# Patient Record
Sex: MALE | Race: WHITE
[De-identification: names, ages, dates, MRNs, and addresses within clinical notes are randomized per-mention and may not be internally consistent; named-entity substitution may affect disease eponyms.]

---

## 2017-01-02 ENCOUNTER — HOSPITAL ENCOUNTER (OUTPATIENT)
Dept: HOSPITAL 45 - C.RAD1850 | Age: 77
Discharge: HOME | End: 2017-01-02
Attending: NURSE PRACTITIONER
Payer: COMMERCIAL

## 2017-01-02 DIAGNOSIS — M85.80: ICD-10-CM

## 2017-01-02 DIAGNOSIS — M25.521: Primary | ICD-10-CM

## 2017-01-02 NOTE — DIAGNOSTIC IMAGING REPORT
RIGHT ELBOW 3 VIEWS



CLINICAL HISTORY: Right elbow pain and swelling. No reported history of trauma.



FINDINGS: 3 views of the right elbow are obtained. No prior studies are

available for comparison at the time of dictation. The skeletal structures are

osteopenic. There is no radiographic evidence of fracture. There is degenerative

spurring from the radial head. Enthesophytes arise from the lateral humeral

epicondyle. There is no joint effusion. A large enthesophyte is seen at the

triceps insertion. Mild dorsal soft tissue edema is noted.



IMPRESSION:



1. Osteopenia and degenerative change as above. No acute bony abnormality is

identified.



2. There is dorsal soft tissue edema, possibly represent bursitis. Clinical

correlation will be required.







Electronically signed by:  Marvin Wild M.D.

1/2/2017 2:31 PM

## 2017-02-20 ENCOUNTER — HOSPITAL ENCOUNTER (OUTPATIENT)
Dept: HOSPITAL 45 - C.RDSM | Age: 77
Discharge: HOME | End: 2017-02-20
Attending: ORTHOPAEDIC SURGERY
Payer: COMMERCIAL

## 2017-02-20 DIAGNOSIS — M25.561: Primary | ICD-10-CM

## 2017-03-24 ENCOUNTER — HOSPITAL ENCOUNTER (OUTPATIENT)
Dept: HOSPITAL 45 - C.RDSM | Age: 77
Discharge: HOME | End: 2017-03-24
Attending: ORTHOPAEDIC SURGERY
Payer: COMMERCIAL

## 2017-03-24 DIAGNOSIS — M25.522: Primary | ICD-10-CM

## 2017-05-24 ENCOUNTER — HOSPITAL ENCOUNTER (OUTPATIENT)
Dept: HOSPITAL 45 - C.PET | Age: 77
Discharge: HOME | End: 2017-05-24
Payer: COMMERCIAL

## 2017-05-24 DIAGNOSIS — R91.1: ICD-10-CM

## 2017-05-24 DIAGNOSIS — C15.5: Primary | ICD-10-CM

## 2017-05-24 DIAGNOSIS — R93.2: ICD-10-CM

## 2017-05-24 NOTE — DIAGNOSTIC IMAGING REPORT
PET/CT



HISTORY:      Esophageal cancer.



TECHNIQUE: PET/CT was performed from the base of the skull through the pelvis

following the intravenous administration of 15 mCi of F18-FDG. Non-contrast CT

imaging was performed over the same range without breath-hold for attenuation

correction of PET images and anatomic correlation, but not for primary

interpretation as it is not of standard diagnostic quality.



CT DOSE:    

 

COMPARISON: PET CT 11/2/2016.



FINDINGS:

 

HEAD AND NECK:  There is no FDG-avid disease or significant lymphadenopathy in

the imaged portions of the head and the neck. Vocal cord paralysis is again

noted.

 

CHEST:  The patient is status post esophagectomy with gastric pull-up. Stable 6

mm upper right peritracheal lymph node. This does not demonstrate abnormal FDG

uptake. The superior mediastinal lymph node adjacent to the left proximal

portion of the gastric pull-up measures 1.5 x 1.0 cm. This is similar in size

compared to the prior study. However, this now demonstrates moderate FDG uptake

with an SUV max of 4.1. No additional sites of FDG avid disease within the

chest. Right posterior pleural thickening remains unchanged. No change in the 2

cm nodular density at the base of the right lower lobe which favors atelectasis

or scarring. This does not demonstrate abnormal FDG uptake. Small patchy density

at the base of the lingula has improved. There is a 1 cm groundglass irregular

nodule within the right upper lobe on image 91. This remains unchanged. This

does not demonstrate abnormal FDG uptake.

 

ABDOMEN/PELVIS:  A focal 1.5 cm area of moderate FDG uptake within the left

hepatic lobe on image 119 within SUV max of 4.1. No definite corresponding

lesion on CT. This could represent misregistration with the adjacent bowel.

Stable hypodense lesions within the liver which likely represent cysts. No FDG

avid retroperitoneal or pelvic lymphadenopathy. Colonic diverticulosis. The

prostate gland is enlarged. Right lateral abdominal wall lipoma is again noted.

 

MUSCULOSKELETAL:  There is no FDG-avid or destructive bone lesion.

 

IMPRESSION:  

1. Interval development of moderate FDG uptake associated with the left superior

mediastinal lymph node consistent with progression of disease.

2. A focal 1.5 cm area of moderate FDG uptake within the left hepatic lobe.

There is no definite corresponding lesion on CT. This may represent

misregistration with the adjacent bowel. However, this bears watching future

examinations to exclude a developing metastatic lesion.

3. Otherwise, no FDG avid disease identified.

4. Stable 1 cm irregular groundglass nodule within the right upper lobe. This

does not demonstrate abnormal FDG uptake. However, the stability favors a

low-grade neoplasm.







Electronically signed by:  Yonathan Reyes M.D.

5/24/2017 12:10 PM



Dictated Date/Time:  5/24/2017 11:45 AM

## 2017-08-16 ENCOUNTER — HOSPITAL ENCOUNTER (OUTPATIENT)
Dept: HOSPITAL 45 - C.RAD | Age: 77
Discharge: HOME | End: 2017-08-16
Attending: INTERNAL MEDICINE
Payer: COMMERCIAL

## 2017-08-16 DIAGNOSIS — J40: Primary | ICD-10-CM

## 2017-08-16 NOTE — DIAGNOSTIC IMAGING REPORT
TWO VIEW CHEST



CLINICAL HISTORY: Bronchitis.



FINDINGS: PA and lateral chest radiographs are compared to study dated 7/6/2016

and correlated with chest CT dated 1/12/2015. The cardiomediastinal silhouette

is unremarkable. There is atherosclerotic calcification of the thoracic aorta.

Chronic interstitial thickening is similar to previous. No airspace

consolidation or pleural effusion is identified. There is no pneumothorax. The

skeletal structures are osteopenic. Degenerative change is noted throughout the

thoracic spine.



IMPRESSION: No active disease in the chest.







Electronically signed by:  Marvin Wild M.D.

8/16/2017 12:59 PM



Dictated Date/Time:  8/16/2017 12:57 PM

## 2017-11-13 ENCOUNTER — HOSPITAL ENCOUNTER (OUTPATIENT)
Dept: HOSPITAL 45 - C.LABSPEC | Age: 77
Discharge: HOME | End: 2017-11-13
Attending: INTERNAL MEDICINE
Payer: COMMERCIAL

## 2017-11-13 DIAGNOSIS — R41.3: ICD-10-CM

## 2017-11-13 DIAGNOSIS — C15.9: Primary | ICD-10-CM

## 2017-11-13 LAB
ALBUMIN/GLOB SERPL: 0.9 {RATIO} (ref 0.9–2)
ALP SERPL-CCNC: 96 U/L (ref 45–117)
ALT SERPL-CCNC: 24 U/L (ref 12–78)
ANION GAP SERPL CALC-SCNC: 8 MMOL/L (ref 3–11)
AST SERPL-CCNC: 24 U/L (ref 15–37)
BASOPHILS # BLD: 0.01 K/UL (ref 0–0.2)
BASOPHILS NFR BLD: 0.2 %
BUN SERPL-MCNC: 20 MG/DL (ref 7–18)
BUN/CREAT SERPL: 14.8 (ref 10–20)
CALCIUM SERPL-MCNC: 8.8 MG/DL (ref 8.5–10.1)
CHLORIDE SERPL-SCNC: 107 MMOL/L (ref 98–107)
CO2 SERPL-SCNC: 27 MMOL/L (ref 21–32)
COMPLETE: YES
CREAT SERPL-MCNC: 1.36 MG/DL (ref 0.6–1.4)
EOSINOPHIL NFR BLD AUTO: 284 K/UL (ref 130–400)
GLOBULIN SER-MCNC: 3.7 GM/DL (ref 2.5–4)
GLUCOSE SERPL-MCNC: 93 MG/DL (ref 70–99)
HCT VFR BLD CALC: 42.4 % (ref 42–52)
IG%: 0.2 %
IMM GRANULOCYTES NFR BLD AUTO: 28.1 %
LYMPHOCYTES # BLD: 1.59 K/UL (ref 1.2–3.4)
MCH RBC QN AUTO: 30.9 PG (ref 25–34)
MCHC RBC AUTO-ENTMCNC: 33.7 G/DL (ref 32–36)
MCV RBC AUTO: 91.6 FL (ref 80–100)
MONOCYTES NFR BLD: 9.7 %
NEUTROPHILS # BLD AUTO: 2.1 %
NEUTROPHILS NFR BLD AUTO: 59.7 %
PMV BLD AUTO: 9.4 FL (ref 7.4–10.4)
POTASSIUM SERPL-SCNC: 3.5 MMOL/L (ref 3.5–5.1)
RBC # BLD AUTO: 4.63 M/UL (ref 4.7–6.1)
SODIUM SERPL-SCNC: 142 MMOL/L (ref 136–145)
TSH SERPL-ACNC: 0.98 UIU/ML (ref 0.3–4.5)
WBC # BLD AUTO: 5.66 K/UL (ref 4.8–10.8)

## 2017-12-01 ENCOUNTER — HOSPITAL ENCOUNTER (OUTPATIENT)
Dept: HOSPITAL 45 - C.CTS | Age: 77
Discharge: HOME | End: 2017-12-01
Attending: INTERNAL MEDICINE
Payer: COMMERCIAL

## 2017-12-01 ENCOUNTER — HOSPITAL ENCOUNTER (INPATIENT)
Dept: HOSPITAL 45 - C.4E | Age: 77
LOS: 1 days | Discharge: ANOTHER HOSPITAL | DRG: 374 | End: 2017-12-02
Attending: INTERNAL MEDICINE | Admitting: INTERNAL MEDICINE
Payer: COMMERCIAL

## 2017-12-01 VITALS
HEART RATE: 84 BPM | SYSTOLIC BLOOD PRESSURE: 120 MMHG | DIASTOLIC BLOOD PRESSURE: 66 MMHG | OXYGEN SATURATION: 90 % | TEMPERATURE: 97.88 F

## 2017-12-01 VITALS
HEART RATE: 91 BPM | SYSTOLIC BLOOD PRESSURE: 143 MMHG | DIASTOLIC BLOOD PRESSURE: 84 MMHG | OXYGEN SATURATION: 96 % | TEMPERATURE: 97.88 F

## 2017-12-01 VITALS
HEIGHT: 67.5 IN | BODY MASS INDEX: 23.08 KG/M2 | HEIGHT: 67.5 IN | BODY MASS INDEX: 23.08 KG/M2 | WEIGHT: 148.81 LBS | WEIGHT: 148.81 LBS

## 2017-12-01 VITALS — SYSTOLIC BLOOD PRESSURE: 143 MMHG | DIASTOLIC BLOOD PRESSURE: 84 MMHG | TEMPERATURE: 97.88 F | HEART RATE: 91 BPM

## 2017-12-01 DIAGNOSIS — F32.9: ICD-10-CM

## 2017-12-01 DIAGNOSIS — C79.89: ICD-10-CM

## 2017-12-01 DIAGNOSIS — K21.9: ICD-10-CM

## 2017-12-01 DIAGNOSIS — I67.82: ICD-10-CM

## 2017-12-01 DIAGNOSIS — G47.00: ICD-10-CM

## 2017-12-01 DIAGNOSIS — C79.31: ICD-10-CM

## 2017-12-01 DIAGNOSIS — C15.5: Primary | ICD-10-CM

## 2017-12-01 DIAGNOSIS — G31.9: ICD-10-CM

## 2017-12-01 DIAGNOSIS — Z79.899: ICD-10-CM

## 2017-12-01 DIAGNOSIS — G93.6: ICD-10-CM

## 2017-12-01 DIAGNOSIS — Z92.3: ICD-10-CM

## 2017-12-01 DIAGNOSIS — E78.00: ICD-10-CM

## 2017-12-01 DIAGNOSIS — R41.3: Primary | ICD-10-CM

## 2017-12-01 LAB
ALBUMIN/GLOB SERPL: 1.1 {RATIO} (ref 0.9–2)
ALP SERPL-CCNC: 106 U/L (ref 45–117)
ALT SERPL-CCNC: 23 U/L (ref 12–78)
ANION GAP SERPL CALC-SCNC: 5 MMOL/L (ref 3–11)
AST SERPL-CCNC: 26 U/L (ref 15–37)
BASOPHILS # BLD: 0.01 K/UL (ref 0–0.2)
BASOPHILS NFR BLD: 0.2 %
BUN SERPL-MCNC: 18 MG/DL (ref 7–18)
BUN/CREAT SERPL: 13.1 (ref 10–20)
CALCIUM SERPL-MCNC: 9 MG/DL (ref 8.5–10.1)
CHLORIDE SERPL-SCNC: 103 MMOL/L (ref 98–107)
CO2 SERPL-SCNC: 29 MMOL/L (ref 21–32)
COMPLETE: YES
CREAT SERPL-MCNC: 1.36 MG/DL (ref 0.6–1.4)
EOSINOPHIL NFR BLD AUTO: 271 K/UL (ref 130–400)
GLOBULIN SER-MCNC: 3.4 GM/DL (ref 2.5–4)
GLUCOSE SERPL-MCNC: 185 MG/DL (ref 70–99)
HCT VFR BLD CALC: 43.5 % (ref 42–52)
IG%: 0.2 %
IMM GRANULOCYTES NFR BLD AUTO: 28.2 %
INR PPP: 1 (ref 0.9–1.1)
LYMPHOCYTES # BLD: 1.33 K/UL (ref 1.2–3.4)
MCH RBC QN AUTO: 30.4 PG (ref 25–34)
MCHC RBC AUTO-ENTMCNC: 33.8 G/DL (ref 32–36)
MCV RBC AUTO: 90.1 FL (ref 80–100)
MONOCYTES NFR BLD: 5.1 %
NEUTROPHILS # BLD AUTO: 1.7 %
NEUTROPHILS NFR BLD AUTO: 64.6 %
PARTIAL THROMBOPLASTIN RATIO: 1.2
PMV BLD AUTO: 8.7 FL (ref 7.4–10.4)
POTASSIUM SERPL-SCNC: 4 MMOL/L (ref 3.5–5.1)
PROTHROMBIN TIME: 10.6 SECONDS (ref 9–12)
RBC # BLD AUTO: 4.83 M/UL (ref 4.7–6.1)
SODIUM SERPL-SCNC: 137 MMOL/L (ref 136–145)
WBC # BLD AUTO: 4.71 K/UL (ref 4.8–10.8)

## 2017-12-01 RX ADMIN — DEXAMETHASONE SODIUM PHOSPHATE ONE MLS/MIN: 4 INJECTION, SOLUTION INTRA-ARTICULAR; INTRALESIONAL; INTRAMUSCULAR; INTRAVENOUS; SOFT TISSUE at 16:30

## 2017-12-01 RX ADMIN — DEXAMETHASONE SODIUM PHOSPHATE SCH MLS/MIN: 4 INJECTION, SOLUTION INTRA-ARTICULAR; INTRALESIONAL; INTRAMUSCULAR; INTRAVENOUS; SOFT TISSUE at 17:49

## 2017-12-01 RX ADMIN — DEXAMETHASONE SODIUM PHOSPHATE SCH MLS/MIN: 4 INJECTION, SOLUTION INTRA-ARTICULAR; INTRALESIONAL; INTRAMUSCULAR; INTRAVENOUS; SOFT TISSUE at 22:16

## 2017-12-01 RX ADMIN — DEXAMETHASONE SODIUM PHOSPHATE ONE MLS/MIN: 4 INJECTION, SOLUTION INTRA-ARTICULAR; INTRALESIONAL; INTRAMUSCULAR; INTRAVENOUS; SOFT TISSUE at 17:45

## 2017-12-01 NOTE — HISTORY AND PHYSICAL
History & Physical


Date of Service


Dec 1, 2017.





History & Physical


ADMISSION DATE : 2017





CHIEF COMPLAINT :


77-year-old  male admitted today with recent onset of rapid 

progressive memory deficit and CT scan findings of a large left temporal mass 

with significant edema.  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 

esophagus.





PRESENT ILLNESS :


Patient with known adenocarcinoma of the distal esophagus.  First diagnosed in 

.  Patient underwent preoperative radiation and chemotherapy.  Subsequently 

he underwent resection of his esophagitis and cervical esophagus to stomach 

anastomosis.  The surgery was done by Dr. Marek Gilbert at [facility].  Patient did well initially for many years.  Subsequently in  he 

developed supraclavicular adenopathy and fine-needle aspiration showed evidence 

of adenocarcinoma which was a metastatic disease related to his adenocarcinoma 

of the esophagus.  Patient has been seen in oncology by Dr. Campos Cuenca and had 

received Chemotherapy.  The last medication he has been on was Herceptin.  He 

has had serial PET scans.  Showing evidence of progressive disease.  His last 

PET scan showed evidence of new lesions in his mediastinum and lung.  Dr. Cuenca 

was planning on restarting Herceptin but this has not been done yet.





Patient was seen in my office about 2 weeks ago.  He was in the office with his 

wife.  He was complaining of progressive difficulty with his memory.  He could 

not remember his children's names.  Could not remember anything.  He was taking 

melatonin and lorazepam to help him sleep because of his chronic insomnia.  He 

thought probably the problem was related to what he was taking so he stopped 

the medication but the problem continues to be quite progressive from what his 

wife described.  For the last 15 years he has been going to [place] for 

hunting and he decided not to go this year because of his memory issue.  

Multiple laboratory tests were done including CBC, chemistry profile, TSH, B12.

  All these tests were quite unremarkable.  He was scheduled for a CT scan of 

the head.  Initially the scan was ordered without and with contrast that in his 

history there is some atypical reaction to IV contrast so the CT scan was done 

without contrast.  Showed evidence of a mass containing calcifications 

involving the left temporal lobe measuring about 5.2 cm in diameter.  There was 

extensive associated edema.  Was a midline shift toward the right side of about 

2 mm.





Other than the memory deficit patient has not had any problem with any headache 

or dizziness or any difficulty with his balance or equilibrium.  He continues 

to carry on his activity.  Has not had any other symptoms.





After the CT scan results were available I did meet with the patient and his 

wife in my office and arrangements were made for admission.





PAST MEDICAL HISTORY : 


* Adenocarcinoma of the distal esophagus.  First diagnosed in .  Treated 

with preoperative chemotherapy and radiation therapy.  Underwent surgery.  

Initially he did quite well.  In  he had an abnormal PET scan showing 

evidence of recurrent disease.  Biopsy of the left supraclavicular lymph node 

was positive for adenocarcinoma.  He was treated with chemotherapy.  Recently 

his PET scans are showing evidence of progressive disease with new pulmonary 

lesions and mediastinal lesions


* Degenerative disc disease of the lumbar spine and spinal stenosis has had 

epidural injections


* Hypercholesterolemia.  Long-standing.  Treated.


* Chronic insomnia


* History of surgery for sinus congestion and inflammation


* History of surgery for trigger fingers in both hands


* Chronic spasms in his legs.


* History of an enlarged prostate and elevated PSA.  He has a benign prostatic 

hypertrophy.  


* Depression.  Treated.





SOCIAL HISTORY :


He is .  Had 3 children.  He did smoke about half a pack per day for 

about 20 years but he stopped about 30 years ago.  He also did chew tobacco and 

he stopped in .  Occasional alcohol.  No excessive coffee tea or soft 

drinks.  He is retired.  He worked with [employer] in the past





FAMILY HISTORY :


His father  in his 40s in an accident.


His mother  at age 72 had coronary artery disease


He has 2 brothers.  Both have coronary artery disease.  One brother has 

polycythemia vera.


He has one sister.


Children are well.





ALLERGIES :


IV contrast reaction in the past.





CURRENT MEDICATIONS :


* Baclofen 5 mg twice a day


* Proscar 5 mg daily


* Simvastatin 40 mg daily


* Fluoxetine 40 mg daily


* Prilosec 20 mg daily


* Multivitamin 1 daily


* Flonase nasal spray 2 sprays in each nostril as needed





REVIEW OF SYSTEMS :


He denied any headache.  No dizziness no lightheadedness.  He was glasses.  No 

recent change in his vision.  No earaches or throat or neck pain.  No chest 

pain no shortness of breath.  Cough.  No abdominal pain no nausea or vomiting.  

He does have chronic problem with reflux.  Dyspepsia.  No problem with his 

bowel movements.  No urinary problem as long as he is taking his medications.  

He does have chronic pain in his back.  Chronic leg spasms.





PHYSICAL EXAMINATION :


General: He is well-developed.  No distress.  His recorded weight is 67.5 kg 

height 171.5 cm BMI 22.9


Vital signs: Blood pressure 143/84, pulse 91, respiration 18, temperature 36.6, 

oxygen saturation 96% on room air.


Skin is warm and dry.  No rash.


HEENT: He was glasses.  Has upper dentures.  Decreased hearing.  He usually has 

hearing aids but he was not wearing them today.  No mucosal abnormalities in 

his nose mouth or throat


Neck is supple.  Nontender.  No lymph node enlargement.  No JVD.  No carotid 

bruit.


Chest: Scar from prior surgery.


Heart: Regular heart sounds with 1/6 systolic murmur.  No rub or gallop.


Lungs are clear.


Axilla no adenopathy.


Breasts are normal.


Abdomen is soft nontender without organomegaly or masses


Back no spinal tenderness


Extremities no edema clubbing or cyanosis.  Osteoarthritic changes.  Scars in 

his hands from prior surgeries.  Good pulses.


Neurological examination he is alert and oriented.  He is not wearing his 

hearing aids and communication with him tonight is difficult because he does 

not really hear very well.  There is no evidence of any deficit.  No problem 

with his balance or equilibrium.








LABORATORY TESTS :


WBC count 4710, hemoglobin 14.7, hematocrit 43.5, platelet count 271,000.  

Prothrombin time 10.6, INR 1.0, PTT 30.1.


Sodium 137, potassium 4.0, chloride 103, CO2 29, BUNs 18, creatinine 1.36, 

glucose 185, calcium 9.0, total bilirubin 0.5, AST 26, AST 23, alkaline 

phosphatase 106, total protein 7.2, albumin 3.8, globulin 3.4.





ASSESSMENT :


* Large mass left temporal area suspected of being metastatic disease.


* Adenocarcinoma of the distal esophagus in  with recurrent disease 

metastatic to the mediastinum and lungs.


* Recent onset of memory deficit


* Hypercholesterolemia


* Insomnia


* Degenerative disc disease of the lumbar spine and spinal stenosis


* Osteoarthritis


* Dyspepsia and reflux


* Depression





PLAN :


As noted I met with the patient and his wife in my office and notified them of 

the findings on the CT scan of the hand.  Arrangements were made for admission 

to medical bed.  Resuscitation level I.  All his laboratory tests were ordered.

  Prior to his admission I did speak with his oncologist Dr. Campos Cuenca and 

discussed with him the findings.  Dr. Cuenca is retiring.  Dr. Ibanez will be 

taking over the patient's care.  I also spoke with Dr. antwan Boykin in radiation oncology.  Auscultations were requested.  Also I 

spoke with Dr. Elif Shah in neurology.





The initial plan is to proceed with an MRI of the brain without and with 

contrast.  Patient was started on IV Decadron.





We decide on the plan of treatment.  The way stands now Dr. Boykin is intending 

on initiating whole brain radiation therapy on Monday.  That that may change 

depending on the MRI results.  Will decide on the need for neurosurgery 

consultation at [facility].  Dr. Grimm did not feel that the 

patient needed to be started on antiseizure medication at this point.





We'll wait for the MRI results.  Decide on transferring patient to [facility] for evaluation and recommendations regarding any potential 

consideration for surgery.  Keeping in mind that patient does have known 

adenocarcinoma of the esophagus and he has progressive disease as documented on 

his last PET scan.

## 2017-12-01 NOTE — DIAGNOSTIC IMAGING REPORT
MRI OF THE BRAIN COMBO



CLINICAL HISTORY: Esophageal cancer. Metastatic disease.



COMPARISON STUDY: CT of the brain dated 12/1/2017.



TECHNIQUE: MRI of the brain was performed utilizing various T1 and T2-weighted

sequences in the axial, sagittal, and coronal planes. Contrast-enhanced

sequences were acquired following the administration of 6.5 cc of Gadavist.



FINDINGS:



Brain parenchyma: There are age-related involutional changes noting mild patchy

subcortical and periventricular microangiopathic disease. There is a

heterogeneous mass lesion identified in the left temporal lobe. This is T1

hyperintense suggesting hemorrhage. The lesion measures 4.8 x 5.8 x 3.9 cm.

There is susceptibility artifact seen within this lesion on the gradient images

which may be related to hemorrhage and/or calcifications. There is significant

surrounding edema. This effaces the left lateral ventricle and the overlying

cortical sulci. There is approximately 3 mm of left-to-right midline shift. This

lesion demonstrates an enhancing rim. Internal enhancement is likely present but

difficult to delineate due to presumed hemorrhage.  No additional enhancing

lesion is identified. There is no restricted diffusion typical for acute

ischemia. No extra-axial fluid collection is seen. The cerebellar tonsils are

normal in configuration.



Ventricles, sulci, and cisterns: Prominent secondary to involutional change.



Pituitary and sella: Unremarkable.



Intracranial vasculature: Normal flow voids are maintained at the skull base.



Orbits: The bony orbits are grossly intact. Orbital contents are normal in

appearance.



Sinuses and mastoids: Clear.



Calvarium: Unremarkable.



Cervical cord: Partially visualized cervical spinal cord is normal in morphology

and signal intensity.





IMPRESSION: 



1. As seen by CT, there is an approximately 6 cm T1 hyperintense and presumably

hemorrhagic mass centered in the left temporal lobe with significant surrounding

edema and mild midline shift. This likely represents a hemorrhagic metastasis

given the history of esophageal carcinoma. A primary intracranial neoplasm is

considered less likely but is the top differential consideration.



2. This lesion demonstrates peripheral enhancement and likely internal

enhancement. This is difficult to differentiate given the intrinsic T1

hyperintensity.



3. No additional intracranial lesions are identified. 



4. There is no evidence of acute ischemia.







Electronically signed by:  Marvin Wild M.D.

12/1/2017 7:28 PM



Dictated Date/Time:  12/1/2017 7:16 PM

## 2017-12-01 NOTE — RADIATION ONCOLOGY CONSULT
Radiation Oncology Consult


Date / Reason


Dec 1, 2017.





Physicians


Primary Care Provider:  Dr. Anne Flores


Medical Oncologist:  Dr. Cuenca/


Radiation Oncologist:  Dr. Joe Boykin





Diagnosis





(1) METASTATIC ESOPHAGEAL CA, BRAIN METS


Stage:  IV








History of Present Illness


I am seeing Mr. Aranda in consultation at the request of Dr. Anne Flores. 





The patient was seen at bedside and his wife was present for the entire 

consultation.





ECOG PS: 1





Mr. Aranda is a 77-year-old gentleman who presents with a history of recurrent 

metastatic esophageal cancer.  As per the wife, the patient was recently going 

to restart chemotherapy due to recurrent disease (medical records from 

[facility] are unavailable at the time of consultation).  The patient did have 

previous chemotherapy and radiation therapy in the past.  More recently, the 

patient was complaining of issues with memory.  Dr. Anne Flores, his primary 

care physician, ordered a CT of the head without contrast on 12/01/2017 which 

revealed: "IMPRESSION: 1. Heterogeneous hyperattenuating intra-axial mass 

containing central calcifications involves the left temporal lobe measuring up 

to 5.2 cm. Extensive associated vasogenic edema is noted with sulcal effacement 

and mild rightward midline shift of 2 mm. Primary glial neoplasm or metastatic 

disease are differential considerations. Neurosurgical consultation and follow-

up MRI of the brain with and without contrast recommended. 2. 11 x 4 mm focal 

area of low-attenuation of the left thalamus medial to the mass may reflect 

reactive edema or small infarction. 3. Atrophy with chronic microvascular 

ischemic changes." Dr. Anne Flores has sent the patient [facility] for direct admission.  We are now being asked to evaluate the patient 

for consideration of radiation therapy.





Currently, the patient is stable overall.  The patient denies significant 

headaches however he does admit to issues with short-term memory.  He does have 

some fatigue.





Pacemaker


Hx Pacemaker:  No





Past History


Past Medical/Surgical History:  Reflux, Depression, Other (Insomnia, 

Hyperlipidemia)


Specify Any Cancer Diagnosis:  


Esophageal cancer





Surgical History


Surgeries:  Yes


Surgeries & Dates:  


Bilateral trigger finger release


Right Carpal Tunnel Release





Radiation History


History of Radiation Therapy:  


3/2008 - 4500 cGy to the chest





Chemotherapy History


History of Chemotherapy:  


Previous history of chemotherapy. Was planning to undergo chemotherapy


treatment shortly.





Social History


Smoking Status:  Never Smoker


Hx Tobacco Use In Past Year?:  No


Do You Dip or Chew Tobacco:  Yes (QUIT 2008)


Hx Alcohol Use:  Yes (Occasionally)


Hx Substance Use :  No





Allergies


Coded Allergies:  


     Iodinated Contrast Media (Verified  Adverse Reaction, Unknown, HYPOTENSION

, 12/1/17)





Home Medications


Scheduled


Cholecalciferol (Vitamin D3), 1,000 INTUNIT PO QAM


Dutasteride (Avodart *), 0.5 MG PO QAM


Fluoxetine (Prozac), 40 MG PO QAM


Omeprazole (Omeprazole), 20 MG PO BID


Simvastatin (Zocor), 40 MG PO QAM


Stool Softener (Stool Softener), 1 TAB PO QAM





Scheduled PRN


Acetaminophen (Tylenol), 1,000 MG PO Q6 PRN for Pain





Review of Systems


Comments:


Comprehensive 13 system review completed.





Physical Exam


Height:   (Feet)  (Inches)   (Centimeters)  (Meters)


Weight:   (Pounds)  (Ounces)   (Kilograms)   (Grams)











  Date Time  Temp Pulse Resp B/P (MAP) Pulse Ox O2 Delivery O2 Flow Rate FiO2


 


12/1/17 16:34 36.6 91 18 143/84 (103) 96   








General Appearance:  WD/WN, no apparent distress


Head:  normocephalic, atraumatic


Eyes:  normal inspection


Neck:  supple, no adenopathy


Respiratory/Chest:  chest non-tender, lungs clear, normal breath sounds, no 

respiratory distress


Cardiovascular:  regular rate, rhythm, no edema, no gallop, no JVD, no murmur


Abdomen/GI:  normal bowel sounds, non tender, soft, no organomegaly, no 

pulsatile mass


Back:  normal inspection


Extremities:  normal inspection


Neurologic/Psych:  CNs II-XII nml as tested, alert, oriented x 3


Skin:  normal color, warm/dry, no rash





Pain Management


Patient Reports Pain:  No


Patient Preferred Pain Scale:  0 - 10


Initial Pain Intensity:  0.0


Pain Management Plan


No pain management plan as per radiation oncology.





Laboratory


Laboratory Results:  not applicable





Pathology


Pathology Results:  were reviewed, pending (Outside results unavailable)





Imaging


Imaging Studies:  were reviewed, and pertinent findings noted below


Imaging Comments














Patient:  LEONA ARANDA Address1:  [address] Rec:  [id number] Address2:  


 


Acct ID:  [id number] [facility] Zip:  [place] [address]


 


YOB: 1940          Sex:  M Room/Bed:  


 


Ref Phy:  Alexander Joyner M.D. SC: TAICTS


 


Att Phy:  Alexander Joyner M.D. Report #:  [id number]


 


Kalyn Phy:  Alexander Joyner M.D. Test:  HWO


 


Admit Phy:   Technician:  CAROLE


 


Interpreting Phy:  Michael David D.O. Diagnosis:  MEMORY DEFICIT


 


Ordering Phy:  Alexander Joyner M.D. Service Date:  12/01/17


 


Admit Date: 12/01/17 MNE: [email or web address]


 


 CONF:


 


 DICTATED BY: Michael David D.O.]]


 


CC: Alexander Joyner M.D.


 


 Endcc:











[~ rep ct add3]]


HEAD WITHOUT CONTRAST (CT)





CLINICAL HISTORY: 77 years-old Male with MEMORY DEF.  Memory loss . History of


metastatic esophageal cancer. 





TECHNIQUE: Multiple axial CT images of the head were obtained without contrast. 


A dose lowering technique was utilized adhering to the principles of ALARA.





CT DOSE:  614.27 mGy.cm





COMPARISON: PET CT 11/1/2017.





FINDINGS: 





There is a hyperattenuating mass heterogeneous mass containing areas of internal


calcification within the left temporal lobe which appears intra-axial measuring


5.2 x 3.5 cm with extensive degree of surrounding vasogenic edema. There is


associated sulcal effacement with gyral expansion within this distribution. 2 mm


rightward midline shift with subfalcine herniation. There is partial effacement


of the left lateral ventricle involving the frontal horn, body, atria and


temporal horn. No hydrocephalus. There is mild to moderate atrophy with


background chronic microvascular ischemic changes. Vascular calcifications are


seen at the level the skull base. Focal area of low-attenuation measuring 11 x 4


mm is seen within the distribution of the left thalamus medial to the mass.





No calvarial fracture. Mastoid air cells and middle ear cavities are clear. Soft


tissues are unremarkable. Orbits are symmetric.





IMPRESSION:  


1. Heterogeneous hyperattenuating intra-axial mass containing central


calcifications involves the left temporal lobe measuring up to 5.2 cm. Extensive


associated vasogenic edema is noted with sulcal effacement and mild rightward


midline shift of 2 mm. Primary glial neoplasm or metastatic disease are


differential considerations. Neurosurgical consultation and follow-up MRI of the


brain with and without contrast recommended.


2. 11 x 4 mm focal area of low-attenuation of the left thalamus medial to the


mass may reflect reactive edema or small infarction.


3. Atrophy with chronic microvascular ischemic changes.











Patient:  LEONA ARANDA Address1:  [address] [place] [address] Rec:  [id number] Address2:  


 


Acct ID:  [id number] [facility] Zip:  [place] [address]


 


YOB: 1940          Sex:  M Room/Bed:  


 


Ref Phy:  Alexander Joyner M.D. SC: C.PET


 


Att Phy:  Leona Cuenca M.D. Report #:  [id number]


 


Kalyn Phy:  Alexander Joyner M.D. Test:  PETCTST


 


Admit Phy:   Technician:  MARAL


 


Interpreting Phy:  David Billingsley M.D. Diagnosis:  LOWER THIRD ESOPHAGUS 

MALIGNANT NEOPLASM


 


Ordering Phy:  Leona Cuenca M.D. Service Date:  11/01/17


 


Admit Date: 11/01/17 MNE: PWRSCRIBE


 


 CONF:


 


 DICTATED BY: David Billingsley M.D.]]


 


CC: Alexander Joyner M.D. Wolfe, David W., M.D.


 


 Endcc:











[~ rep ct add3]]


PET/CT SKULL-THIGH





CLINICAL HISTORY: ESOPHAGEAL CANCER    





COMPARISON STUDY:  5/24/2017





FINDINGS: The patient was injected with 12.8 mCi of F 18 labeled FDG. Following


the standard induction phase, PET/CT scanning was performed from the skull base


to the upper thigh region.





Within the base of the right neck, there is a new FDG avid nodule which fuses to


a tiny 3 mm lymph node. There are new foci of increased FDG activity with SUV


maximum of 6.7, which fuses to an ill-defined nodule abutting the vessels the


right cervicothoracic inlet. There is a new focus of increased FDG activity with


SUV maximum of 3 fusing to a 4 mm lymph node at the level of the sternal notch.


There is a new focus of increased FDG activity with SUV maximum of 6.9 fusing to


an ill-defined soft tissue nodule just inferior to the esophageal anastomosis


within the left superior mediastinum.





There is a new 8 mm right middle lobe pulmonary nodule, which is mildly FDG avid


with SUV maximum of 1.9. Areas of presumed right basilar atelectasis remain


stable. There is also a new 3 mm right apical pulmonary nodule which is not FDG


avid.





Within the liver, there is a stable right lobe hepatic hypodensity which is not


FDG avid and may represent a cyst. There is no pathologic adrenal gland


activity. There is no pathologic marline activity within the abdomen or pelvis.


There is physiologic urinary tract and bowel activity.





There is no pathologic skeletal activity.





IMPRESSION:  


1. Progressive metastatic disease.


2. New 8 mm FDG avid right middle lobe pulmonary nodule consistent with a


pulmonary metastasis. Also evident is a new 3 mm right apical pulmonary nodule


which is not FDG avid.


3. New intensely FDG avid lower cervical and superior mediastinal lymph nodes,


consistent with metastatic disease





Assessment & Recommendations


Mr. Aranda is a 77-year-old gentleman who presents with metastatic esophageal 

cancer to the brain.  The patient was most recently planning to undergo a 

course of chemotherapy underneath the supervision of Dr. Cuenca for recurrent 

metastatic disease (records unavailable to temporal consultation to confirm).  

The patient did have a CT of that without contrast completed today on 12/01/ 2017 which does potentially revealed a large metastatic deposit involving the 

left temporal lobe.  The patient does have some issues with short-term memory.  

We have been asked to evaluate the patient for consideration of radiation 

therapy to the brain.





After evaluating the patient, I recommended proceeding with an MRI of the brain 

with contrast to determine his full extent of disease.  Additionally, I have 

discussed the case with Dr. Anne Flores and we both agreed to initiate decadron 

to initially help with his symptoms.  I have recommended Decadron 4 mg every 6 

hours.  If the patient continues to remain stable over the weekend, we have 

agreed to consider palliative whole brain radiation therapy and will bring him 

down for treatment planning and start radiation therapy on Monday.  I would 

recommend radiation therapy in 15 fractions. I have also recommended a medical 

oncology consultation and neurology consultation.





We explained the indications, alternatives, benefits, risks and side effects of 

external beam radiation therapy to the brain.  We explain the most common side 

effects including but not limited to skin erythema, skin break down, hair loss, 

radiation necrosis, fatigue, short-term memory loss, decreased neurocognitive 

performance, cerebral edema, hearing loss, damage to cochlea structures, 

seizures, loss of sensory and or motor function.  We explained the treatment 

planning process and what to expect before during and after treatment.  The 

patient understands and would be willing to consent to treatment.





The patient and family had multiple questions which were answered to their full 

satisfaction.





Thank you for allowing us to participate in the care of this patient. 





This chart was completed in part utilizing Dragon Speech Voice Recognition 

software. Attempts were made to minimize the grammatical errors, random word 

insertions, pronoun errors and incomplete sentences. Any formal questions or 

concerns about the content, text or information contained within the body of 

this dictation should be directly addressed to the provider for clarification.





Joe Boykin MD


Department of Radiation Oncology


[facility]





Total Time In Consultation


I spent 30 minutes examining and counseling the patient. I spent 15 minutes 

completing this note. 





Copy To


Alexander Joyner M.D.

## 2017-12-01 NOTE — MEDICAL CONSULT
Consultation


Date of Consultation:


Dec 1, 2017.


Attending Physician:


Alexander Joyner M.D.


Reason for Consultation:


history of esophageal cancer


abnormal CT head with mass involving the left temporal lobe and vasogenic edema


History of Present Illness


77 year old male with history of esophageal cancer followed by Dr Cuenca.


He recevied concurrent preoperative chemoRT at initial diagnosis follwoed by 

surgery at [facility]


He was subsequently found to have recurrent disease and received chemotherapy 

with xeloda oxaliplatin and hereptin in 2012


In 10/2013 PET-CT scan showed no evidence of active disease 


In 7/2015  PET-CT scan showed marline changes in the paraesophageal areas and he 

underwent biopsy at that time that was positive for recurrence


He received chemotherapy with xeloda, oxaliplatin and herceptin and his follow 

up scan showed good response and chemotherapy was discontinued and the patient 

was followed by observation


In 8/2016 he had a PET-CT scan which showed persistence of a LN that had 

increased from 1.2 to 1.5cm and he was given the option of resuming herceptin 

or expectant follow up and he opted to be followed expectantly


PET-CT scan in 5/2017 showed progression and Dr Cuenca discussed with him and he 

was followed expectantly


He had a follow up PET-CT scan  PET-CT scan on 11/1/17 which showed progressive 

metastatic disease, new 8mm FDG avid RML nodule consistent with pulmonary 

metastasis and a new 3mmright apical pulmonary nodule which is not FDG avid. He 

also had evidence of new hypermetabolic lower cervical and superior mediastinal 

lymph nodes.


Last follow up with Dr Cuenca was on 11/7/17 and his plan was to resume 

herceptin due to the progression on the patient's PET-CT scan.





Patient and his wife and daughter state that from about 6 weeks he notice mild 

frontal headache/discomfort on and off and memory changes. He states that he 

was forgetting names of his grandchildren and friends though he knew who they 

were.


Also notice word finding difficulty and was confusing words.


He states that this morning he had mild dizziness when he stood up for a few 

seconds but then it resolved.


He denies any nausea or vomiting.


He denies any paresthesias


He denies any weakness


He states that he has chronic lower abdominal pain


Also recently completed a course of antibiotic for a URI. He denies any cough 

or shortness of breath





He had CT head showing :  


"1. Heterogeneous hyperattenuating intra-axial mass containing central


calcifications involves the left temporal lobe measuring up to 5.2 cm. Extensive


associated vasogenic edema is noted with sulcal effacement and mild rightward


midline shift of 2 mm. Primary glial neoplasm or metastatic disease are


differential considerations. Neurosurgical consultation and follow-up MRI of the


brain with and without contrast recommended.


2. 11 x 4 mm focal area of low-attenuation of the left thalamus medial to the


mass may reflect reactive edema or small infarction.


3. Atrophy with chronic microvascular ischemic changes."











ECOG PS 1





Past Medical/Surgical History


PMH/PSH: high cholesterol, enlarged prostate, recurrent esophageal cancer, 

depression, insomnia


EGD with biopsy, carpal tunnel surgery, surgical resection for esophageal 

cancer after chemoRT





Social History


 lives with his wife


Smoking Status:  Never Smoker


Smokeless Tobacco Use:  Yes


Alcohol Use:  rare


Drug Use:  none


Marital Status:  


Housing Status:  lives with family





Allergies


Coded Allergies:  


     Iodinated Diagnostic Agents (Verified  Adverse Reaction, Intermediate, 

HYPOTENSION, 12/1/17)





Current Inpatient Medications





Current Inpatient Medications








 Medications


  (Trade)  Dose


 Ordered  Sig/Petra


 Route  Start Time


 Stop Time Status Last Admin


Dose Admin


 


 Dexamethasone


 Sodium Phosphate


 4 mg/Syringe  1 ml @ 1


 mls/min  Q6H


 IV  12/1/17 22:00


 12/31/17 21:59   


 


 


 Ondansetron HCl 8


 mg/Dextrose  54 ml @ 


 200 mls/hr  Q4H  PRN


 IV  12/1/17 16:00


 12/31/17 15:59   


 


 


 Fluoxetine HCl


  (Prozac Cap)  40 mg  QAM


 PO  12/2/17 08:00


 1/1/18 07:59   


 


 


 Simvastatin


  (Zocor Tab)  40 mg  QAM


 PO  12/2/17 08:00


 1/1/18 07:59   


 


 


 Pantoprazole


 Sodium


  (Protonix Tab)  40 mg  QAM


 PO  12/2/17 08:00


 1/1/18 07:59   


 


 


 Finasteride


  (Proscar Tab)  5 mg  QAM


 PO  12/2/17 08:00


 1/1/18 07:59   


 


 


 Alprazolam


  (Xanax Tab)  0.5 mg  Q6H  PRN


 PO  12/1/17 17:45


 12/31/17 17:44   


 











Review of Systems


Constitutional:  + fatigue (mild), No fever, No chills, No sweats, No weight 

loss, No weakness


Eyes:  No worsening of vision, No eye pain, No redness, No diplopia


ENT:  No unusual epistaxis, No nasal symptoms, No sore throat, No trouble 

swallowing


Respiratory:  + cough (occasional. states improved since he recently took 

antibiotic), No sputum, No wheezing, No shortness of breath, No dyspnea on 

exertion, No dyspnea at rest, No hemoptysis


Cardiovascular:  No chest pain, No orthopnea, No PND, No edema, No palpitations


Abdomen:  No pain, No nausea, No vomiting, No diarrhea, No constipation, No GI 

bleeding


Musculoskeletal:  + joint pain (states that he has mild arthritis in his hands, 

chronic ), No muscle pain, No swelling, No calf pain


Genitourinary - Male:  No hematuria, No dysuria, No urinary frequency


Neurologic:  + memory loss, No paralysis, No weakness, No numbness/tingling, No 

vertigo, No balance problems


Psychiatric:  + insomnia, No depression symptoms, No anhedonism, No anxiety


Endocrine:  No fatigue, No excessive thirst, No excessive urination


Hematologic / Lymphatic:  No abnormal bleeding/bruising, No swollen lymph nodes


Integumentary:  No rash, No itch





Physical Exam











  Date Time  Temp Pulse Resp B/P (MAP) Pulse Ox O2 Delivery O2 Flow Rate FiO2


 


12/1/17 17:00 36.6 91 18 143/84  Room Air  


 


12/1/17 16:34 36.6 91 18 143/84 (103) 96   








General Appearance:  WD/WN, no apparent distress


Head:  normocephalic, atraumatic


Eyes:  PERRL, EOMI, sclerae normal


ENT:  pharynx normal


Neck:  supple, no adenopathy, no JVD


Respiratory/Chest:  chest non-tender, lungs clear, normal breath sounds, no 

respiratory distress, no accessory muscle use


Cardiovascular:  regular rate, rhythm, no edema, no gallop, no murmur


Abdomen/GI:  normal bowel sounds, non tender, soft, no organomegaly


Back:  normal inspection, no CVA tenderness


Extremities/Musculoskelatal:  no calf tenderness, no pedal edema, non-tender


Neurologic/Psych:  alert (sensation to LT intact, motor strength 5/5 in 

bilateral upper and lower extremity, no pronator drift), normal mood/affect, 

oriented x 3, + pertinent finding (speech is clear, follows simple commands)


Skin:  warm/dry, no rash


Lymphatic:  no adenopathy





Laboratory Results





Last 24 Hours








Test


  12/1/17


15:58


 


White Blood Count 4.71 K/uL 


 


Red Blood Count 4.83 M/uL 


 


Hemoglobin 14.7 g/dL 


 


Hematocrit 43.5 % 


 


Mean Corpuscular Volume 90.1 fL 


 


Mean Corpuscular Hemoglobin 30.4 pg 


 


Mean Corpuscular Hemoglobin


Concent 33.8 g/dl 


 


 


Platelet Count 271 K/uL 


 


Mean Platelet Volume 8.7 fL 


 


Neutrophils (%) (Auto) 64.6 % 


 


Lymphocytes (%) (Auto) 28.2 % 


 


Monocytes (%) (Auto) 5.1 % 


 


Eosinophils (%) (Auto) 1.7 % 


 


Basophils (%) (Auto) 0.2 % 


 


Neutrophils # (Auto) 3.04 K/uL 


 


Lymphocytes # (Auto) 1.33 K/uL 


 


Monocytes # (Auto) 0.24 K/uL 


 


Eosinophils # (Auto) 0.08 K/uL 


 


Basophils # (Auto) 0.01 K/uL 


 


RDW Standard Deviation 44.1 fL 


 


RDW Coefficient of Variation 13.3 % 


 


Immature Granulocyte % (Auto) 0.2 % 


 


Immature Granulocyte # (Auto) 0.01 K/uL 


 


Prothrombin Time 10.6 SECONDS 


 


Prothromb Time International


Ratio 1.0 


 


 


Activated Partial


Thromboplast Time 30.1 SECONDS 


 


 


Partial Thromboplastin Ratio 1.2 


 


Sodium Level 137 mmol/L 


 


Potassium Level 4.0 mmol/L 


 


Chloride Level 103 mmol/L 


 


Carbon Dioxide Level 29 mmol/L 


 


Anion Gap 5.0 mmol/L 


 


Blood Urea Nitrogen 18 mg/dl 


 


Creatinine 1.36 mg/dl 


 


Estimated GFR (


American) 57.0 


 


 


Estimated GFR (Non-


American 49.1 


 


 


BUN/Creatinine Ratio 13.1 


 


Random Glucose 185 mg/dl 


 


Calcium Level 9.0 mg/dl 


 


Total Bilirubin 0.5 mg/dl 


 


Aspartate Amino Transf


(AST/SGOT) 26 U/L 


 


 


Alanine Aminotransferase


(ALT/SGPT) 23 U/L 


 


 


Alkaline Phosphatase 106 U/L 


 


Total Protein 7.2 gm/dl 


 


Albumin 3.8 gm/dl 


 


Globulin 3.4 gm/dl 


 


Albumin/Globulin Ratio 1.1 











Assessment & Plan


77 year old male with history of T3N1 esophageal ccnacer s/p chemoRT followed 

by surgery, then subsequently developed recurrence 


s/p xeloda and oxaliplatin and herceptin in 2012 and 2015 for recurrent disease 

and more recently has been followed expectantly


Recent PET-CT scan showed progression and Dr Cuenca saw him on 11/7/17 and 

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


He had a CT scan today showing left temporal mass measuring 5.2x3.5cm with 

extensive degree of surrounding vasogenic edema and a focal area of low 

attenuation of the left thalamus medial to the mass which may reflect reactive 

edema or small infarction


I discussed with the patient and his family and Dr Anne Flores


He received dexamethasone 10mg IV x 1 


Agree with obtain MRI brain to evaluate the mass lesion as well as if he has 

other lesions


continue with dexamethasone 4mg IV every 6hrs


Recommended Neurology or Neurosurgery input regarding any need for seizure 

prophylaxis  after MRI brain would recommend consider obtain Neurosurgery input 

as well regarding whether if he is a candidate for any NS intervention from 

their standpoint


I spoke with Rad Onc Dr Boykin this evening and he agreed with steroids and 

neurology consult 


Dr Anne Flores has discussed with him and Dr Boykin recommended radiation and 

discussed plan for RT with the patient and family


Discussed with Dr Anne Flores and he has discussed with Neurology and said no 

prophylactic anti-seizure med was recommended at this time


Patient denies any headache or symptoms at this time


In terms of the progression  on his recent PET-CT scan, he will need to follow 

up in the office upon discharge, and further  treatment can be considered after 

completion of treatment of brain metastasis


Thank you for allowing me to participate in the care of this patient

## 2017-12-01 NOTE — DIAGNOSTIC IMAGING REPORT
HEAD WITHOUT CONTRAST (CT)



CLINICAL HISTORY: 77 years-old Male with MEMORY DEF.  Memory loss . History of

metastatic esophageal cancer. 



TECHNIQUE: Multiple axial CT images of the head were obtained without contrast. 

A dose lowering technique was utilized adhering to the principles of ALARA.



CT DOSE:  614.27 mGy.cm



COMPARISON: PET CT 11/1/2017.



FINDINGS: 



There is a hyperattenuating mass heterogeneous mass containing areas of internal

calcification within the left temporal lobe which appears intra-axial measuring

5.2 x 3.5 cm with extensive degree of surrounding vasogenic edema. There is

associated sulcal effacement with gyral expansion within this distribution. 2 mm

rightward midline shift with subfalcine herniation. There is partial effacement

of the left lateral ventricle involving the frontal horn, body, atria and

temporal horn. No hydrocephalus. There is mild to moderate atrophy with

background chronic microvascular ischemic changes. Vascular calcifications are

seen at the level the skull base. Focal area of low-attenuation measuring 11 x 4

mm is seen within the distribution of the left thalamus medial to the mass.



No calvarial fracture. Mastoid air cells and middle ear cavities are clear. Soft

tissues are unremarkable. Orbits are symmetric.



IMPRESSION:  

1. Heterogeneous hyperattenuating intra-axial mass containing central

calcifications involves the left temporal lobe measuring up to 5.2 cm. Extensive

associated vasogenic edema is noted with sulcal effacement and mild rightward

midline shift of 2 mm. Primary glial neoplasm or metastatic disease are

differential considerations. Neurosurgical consultation and follow-up MRI of the

brain with and without contrast recommended.

2. 11 x 4 mm focal area of low-attenuation of the left thalamus medial to the

mass may reflect reactive edema or small infarction.

3. Atrophy with chronic microvascular ischemic changes.



These findings were discussed with Dr. Anne Flores on 12/1/2017 at 3:02 PM



The above report was generated using voice recognition software. It may contain

grammatical, syntax or spelling errors.







Electronically signed by:  Arnold David M.D.

12/1/2017 3:03 PM



Dictated Date/Time:  12/1/2017 2:52 PM

## 2017-12-02 VITALS
TEMPERATURE: 97.88 F | HEART RATE: 86 BPM | OXYGEN SATURATION: 94 % | DIASTOLIC BLOOD PRESSURE: 71 MMHG | SYSTOLIC BLOOD PRESSURE: 118 MMHG

## 2017-12-02 VITALS
DIASTOLIC BLOOD PRESSURE: 71 MMHG | HEART RATE: 86 BPM | SYSTOLIC BLOOD PRESSURE: 118 MMHG | OXYGEN SATURATION: 94 % | TEMPERATURE: 97.88 F

## 2017-12-02 VITALS — OXYGEN SATURATION: 94 %

## 2017-12-02 LAB
ANION GAP SERPL CALC-SCNC: 7 MMOL/L (ref 3–11)
BASOPHILS # BLD: 0 K/UL (ref 0–0.2)
BASOPHILS NFR BLD: 0 %
BUN SERPL-MCNC: 20 MG/DL (ref 7–18)
BUN/CREAT SERPL: 16 (ref 10–20)
CALCIUM SERPL-MCNC: 8.9 MG/DL (ref 8.5–10.1)
CHLORIDE SERPL-SCNC: 104 MMOL/L (ref 98–107)
CO2 SERPL-SCNC: 25 MMOL/L (ref 21–32)
COMPLETE: YES
CREAT CL PREDICTED SERPL C-G-VRATE: 45.6 ML/MIN
CREAT SERPL-MCNC: 1.25 MG/DL (ref 0.6–1.4)
EOSINOPHIL NFR BLD AUTO: 252 K/UL (ref 130–400)
GLUCOSE SERPL-MCNC: 170 MG/DL (ref 70–99)
HCT VFR BLD CALC: 38.8 % (ref 42–52)
IG%: 0.2 %
IMM GRANULOCYTES NFR BLD AUTO: 13.5 %
LYMPHOCYTES # BLD: 0.83 K/UL (ref 1.2–3.4)
MCH RBC QN AUTO: 30.2 PG (ref 25–34)
MCHC RBC AUTO-ENTMCNC: 34.3 G/DL (ref 32–36)
MCV RBC AUTO: 88.2 FL (ref 80–100)
MONOCYTES NFR BLD: 0.7 %
NEUTROPHILS # BLD AUTO: 0 %
NEUTROPHILS NFR BLD AUTO: 85.6 %
PMV BLD AUTO: 8.5 FL (ref 7.4–10.4)
POTASSIUM SERPL-SCNC: 4.1 MMOL/L (ref 3.5–5.1)
RBC # BLD AUTO: 4.4 M/UL (ref 4.7–6.1)
SODIUM SERPL-SCNC: 137 MMOL/L (ref 136–145)
WBC # BLD AUTO: 6.14 K/UL (ref 4.8–10.8)

## 2017-12-02 RX ADMIN — DEXAMETHASONE SODIUM PHOSPHATE SCH MLS/MIN: 4 INJECTION, SOLUTION INTRA-ARTICULAR; INTRALESIONAL; INTRAMUSCULAR; INTRAVENOUS; SOFT TISSUE at 10:16

## 2017-12-02 RX ADMIN — DEXAMETHASONE SODIUM PHOSPHATE SCH MLS/MIN: 4 INJECTION, SOLUTION INTRA-ARTICULAR; INTRALESIONAL; INTRAMUSCULAR; INTRAVENOUS; SOFT TISSUE at 04:17

## 2017-12-02 NOTE — PROGRESS NOTE
Progress Note


Date of Service


Dec 2, 2017.





Progress Note


MRI of the brain with and without and with contrast showed a 6 cm hemorrhagic 

mass centered in the left temporal lobe with significant surrounding edema and 

mild midline shift approximately 3 mm left-to-right.  The diagnostic 

possibilities include a hemorrhagic metastases given the history of esophageal 

adenocarcinoma.  A primary cerebral neoplasm is also a possibility.





I explained the findings to the patient and his family both his wife and 

daughter.  In the morning I was call [facility] to discuss the 

findings with neurosurgeon and decide on transferring the patient to [facility] for further evaluation with possible biopsy and surgical 

intervention.  Everyone is in agreement.

## 2017-12-02 NOTE — NEUROLOGY CONSULTATION
DATE OF CONSULTATION:  12/02/2017

 

REASON FOR CONSULTATION:  Left hemispheric mass.

 

HISTORY OF PRESENT ILLNESS:  The patient is a 77-year-old right-handed male

with known adenocarcinoma of the esophagus initially diagnosed in 2008,

status post radiation and chemotherapy.  In 2012, he developed

supraclavicular adenopathy which fine needle aspiration proved to be

adenocarcinoma.  His last medication was Herceptin.  He has had serial PET

scans showing progressive disease with new lesions in the mediastinum and

lungs.  The patient notes a history of about 4 weeks of difficulty with word

finding and some difficulty with memory.  He only recently noted some very

mild head pain.  He has not had any episodes of loss of consciousness,

staring spells, focal-jerking activity.  He has not had any change in vision,

weakness, numbness.  An outpatient CT of the head showed a large left

hemispheric mass with MRI of the brain shows a large 6 cm hemorrhagic mass

centered in the left temporal lobe with significant surrounding edema and

midline shift.  No other enhancing lesions were noted.  The patient was

appropriately started on steroids, Dr. Sin asked my opinion about

whether or not he needed anticonvulsants.  He is soon to be transferred to

[place] for neurosurgical opinion regarding the need to biopsy.

 

PAST MEDICAL HISTORY:  Notable for the above, degenerative disk disease,

hyperlipidemia, and chronic insomnia.

 

PAST SURGICAL HISTORY:  Chronic spasms in his legs, elevated PSA, depression,

history for sinus congestion and inflammation and trigger finger.

 

SOCIAL HISTORY:  Smoked until 30 years ago.  Chewed tobacco, stopped in 2008.

 Occasional alcohol.

 

FAMILY HISTORY:  Noncontributory.

 

ALLERGIES:  IV CONTRAST.

 

MEDICATIONS ON ADMISSION:  Baclofen, Proscar, simvastatin, fluoxetine,

Prilosec, multiple vitamins, and Flonase.

 

REVIEW OF SYSTEMS:  No fevers, chills or sweats.  His weight has been stable.

 

LABORATORY DATA:  White count 4.71, H&H and platelet count are normal.  PT,

PTT normal.  Chemistry profile notable for random glucose of 185.

 

PHYSICAL EXAMINATION:

VITAL SIGNS:  Temperature 36.6, pulse 86, respiratory rate 17 and O2

saturation 94%.

GENERAL:  The patient is awake and alert, appearing well rather jocular.  He

is oriented to person.  He is unable to find the word [facility] [place].  He knows

that it is December.  He has no right/left confusion.  There is some

difficulty with naming to confrontation.  Repetitions and 3-step commands are

mildly slow as well.

NECK:  There are no carotid bruits.

HEART:  No heart murmurs.  Heart has regular rate and rhythm.

HEENT:  Pupils are equal, round and reactive to light.  There are bilateral

cataracts.  I could not reliably visualize the optic nerves.  Fields were

full, motility normal, normal facial sensation.  The face is symmetric. 

Speech is nondysarthric.  Tongue is midline.  Uvula elevates symmetrically.

MOTOR:  Normal bulk and tone.  No resting tremor.  Cogwheel rigidity or

seizure activity is noted.  There is full strength, no drift.  Normal rapid

alternating movements.  Reflexes may be mildly brisker on the right than the

left.  Toes are downgoing.  Finger-to-nose and heel-to-shin are normal.  Gait

is normal.

 

IMPRESSION AND PLAN:  This patient has known metastatic adenocarcinoma of the

esophagus.  He has a large single left hemispheric mass, which is the reason

for his expressive language dysfunction.  He has not had anything clinically

that I believed to be a seizure at this point.  No studies have proven

definitively prophylactic anticonvulsants in the setting of a brain tumor are

of significant utility.  That having been said, neurosurgery may feel

differently, especially if they are planning on biopsying the lesion and I 
deferred to

them in that regard.  I agree with Decadron as it is being used.  I spoke to

the patient about refraining from about not driving presently, this is

because of risk of seizure.  I did speak to his wife and his daughter about

anticonvulsants and their potential side effects, we spoke of the use of

Keppra.  I explained to the wife that I believe that the opinion at [place]

was to determine whether or not a biopsy would be performed or whether the

patient would be treated empirically with radiation.  I doubt, and I explained 
this

to the patient and her ,  that NS will try to do a subtotal resection

due to the tumor's large size and its location proximal to eloquent language.  
I would

be glad to see the patient in followup if necessary.

 

Thank you for this consultation.

 

 

ANA MARIA

## 2017-12-02 NOTE — DISCHARGE INSTRUCTIONS
Discharge Instructions


Date of Service


Dec 2, 2017.





Admission


Reason for Admission:  Metastatic Esophageal With Brain Mets





Discharge


Discharge Diagnosis / Problem:  left temporal cerebral mass.  Metastatic 

adenocarcinoma of the esophagus





Discharge Goals


Goal(s):  Decrease discomfort, Improve function, Increase independence, Improve 

disease control





Activity Recommendations


Activity Limitations:  as noted below (activity level will be determined after 

acute hospital stay)





.





Current Hospital Diet


Patient's current hospital diet: Regular Diet





Discharge Diet


Recommended Diet:  Regular Diet





Pending Studies


Studies pending at discharge:  no





Medical Emergencies








.


Who to Call and When:





Medical Emergencies:  If at any time you feel your situation is an emergency, 

please call 911 immediately.





.





Non-Emergent Contact


Non-Emergency issues call your:  Primary Care Provider





.


.








"Provider Documentation" section prepared by Alexander Sin.








.





VTE Core Measure


Inpt VTE Proph given/why not?:  Treatment not indicated

## 2017-12-02 NOTE — PROGRESS NOTE
Progress Note


Date of Service


Dec 2, 2017.





Progress Note


77-year-old  male admitted with


* 6 cm left temporal lobe mass with edema and hemorrhage and 3 mm left-to-right 

shift.


* Metastatic adenocarcinoma of the esophagus


* Recent onset of memory deficit


* Hypercholesterolemia


* Mild depression


* Insomnia


* Degenerative disc disease of the lumbar spine


* Osteoarthritis





Patient was admitted.  MRI of the brain was done without and with contrast.  It 

showed evidence of the 6 cm left temporal mass.  There was significant 

surrounding edema.  Also the mass was hemorrhagic.  There is a shift from left 

to right of approximately 3 mm.  Patient was started on IV Decadron.  Other 

than his memory deficit patient has been completely asymptomatic.  He has not 

had any headache or dizziness or lightheadedness.  Has not had any problem with 

his vision.  No problem with his balance and equilibrium.  He continues to 

carry on his usual activities without any difficulty.





Patient was seen in radiation oncology consultation by Dr. Boykin, in oncology 

by Dr. Ibanez.  A neurology consultation has been requested from Dr. Elif Shah.  I spoke with her last night.  She did not feel that the patient 

needed to be started on any antiepileptic medication.





This morning he is doing well.  He does have chronic problem with insomnia.  

Was not really able to sleep.  He was resting comfortably.  He has no headache 

or dizziness or lightheadedness.  No blurred vision.  No earaches or throat or 

neck pain.  No chest pain no shortness of breath.  No cough.  No abdominal 

pain.  No nausea no vomiting.  No problem urinating.  No pain in his back or 

extremities.





EXAMINATION :  


GENERAL: Well-developed.  No distress.


VITAL SIGNS: Blood pressure 118/71, pulse 86, respirations 17, temperature 36.6

, oxygen saturation 94% on room air


SKIN: Warm and dry.  No rash.


HEENT: He does have evidence of anisocoria with the left pupil slightly larger 

than the right one but reactive.  He does wear glasses.  Markedly decreased 

hearing.  Has dentures.


NECK: Supple.  Nontender.  No lymph node or thyroid enlargement.  No JVD.  No 

carotid bruit.  Neck scar from prior surgery


HEART: Regular heart sounds with 1/6 systolic murmur


LUNGS: Clear.


ABDOMEN: Soft nontender without organomegaly or masses


BACK: No spinal tenderness


EXTREMITIES: No edema clubbing or cyanosis.  Good pulses


NEUROLOGICAL EXAMINATION: He is awake and alert.  He is answering 

appropriately.  He does have difficulty with his memory.  He was not even able 

to recall his home phone number.  There is no evidence of any lateralized 

deficit.  No problem with his gait or equilibrium.





LABORATORY TESTS :


WBC count 6140, hemoglobin 13.3, hematocrit 38.4, platelet count 252,000.  

Sodium 137, potassium 4.1, chloride 104, CO2 25, BUNs 20, creatinine 1.25, 

glucose 170, calcium 8.9.





ASSESSMENT :


* Left temporal mass measuring 6 cm in diameter with hemorrhage and edema and 

left-to-right shift of 3 mm.  Not sure at this point whether we are dealing 

with a metastatic disease related to his adenocarcinoma or a primary cerebral 

malignancy.


* Adenocarcinoma of the esophagus with metastases pulmonary and lymph node.  

His last PET scan showed evidence of progressive disease with new lesions in 

the mediastinum and lungs.


* Memory deficit new-onset.


* Gastroesophageal reflux


* Depression


* Insomnia





PLAN :


* Continue IV Decadron


* Making arrangements for transfer to [facility].  I spoke this 

morning with Dr. Mabry.  The patient was accepted for transfer care.  We are in 

process of finalizing arrangements.

## 2017-12-20 ENCOUNTER — HOSPITAL ENCOUNTER (OUTPATIENT)
Dept: HOSPITAL 45 - C.ONC | Age: 77
Discharge: HOME | End: 2017-12-20
Attending: PHYSICIAN ASSISTANT
Payer: COMMERCIAL

## 2017-12-20 VITALS
DIASTOLIC BLOOD PRESSURE: 83 MMHG | OXYGEN SATURATION: 95 % | HEART RATE: 78 BPM | TEMPERATURE: 98.42 F | SYSTOLIC BLOOD PRESSURE: 148 MMHG

## 2017-12-20 DIAGNOSIS — Z09: Primary | ICD-10-CM

## 2017-12-20 DIAGNOSIS — C15.5: ICD-10-CM

## 2017-12-20 DIAGNOSIS — C79.31: ICD-10-CM

## 2017-12-20 NOTE — RADIATION ONCOLOGY FOLLOW-UP
Radiation Oncology Follow-Up


Date of Visit


Dec 20, 2017.





Reason For Visit


Follow-up post surgery





Radiation Completion Date


has not started RT





Diagnosis





(1) METASTATIC ESOPHAGEAL CA, BRAIN METS


Status:  Acute        Onset Date:  2008


Location:  esophageal carcinoma with brain metastasis


Stage:  IV


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


Status post neoadjuvant radiation and chemotherapy.  Radiation completed 03/05/ 2008 received 4500 cGy


Status post resection April 2008 complete response


PET/CT 01/18/2012 mildly enlarged lymph nodes right chest and supraclavicular 

region


Status post needle aspiration biopsy showing metastatic adenocarcinoma 02/16/ 2012


Systemic chemotherapy


Onset of frequent headaches and finding of brain metastasis


Status post craniotomy with resection 12/12/2017, metastatic adenocarcinoma  

Last Edited By: Kiya Carreno on Dec 20, 2017 14:05





History of Present Illness


Mr. Aranda is a 77-year-old gentleman who presents with a history of recurrent 

metastatic esophageal cancer.  As per the wife, the patient was recently going 

to restart chemotherapy due to recurrent disease (medical records from 

[facility] are unavailable at the time of consultation).  The patient did have 

previous chemotherapy and radiation therapy in the past.  More recently, the 

patient was complaining of issues with memory.  Dr. Anne Flores, his primary 

care physician, ordered a CT of the head without contrast on 12/01/2017 which 

revealed: "IMPRESSION: 1. Heterogeneous hyperattenuating intra-axial mass 

containing central calcifications involves the left temporal lobe measuring up 

to 5.2 cm. Extensive associated vasogenic edema is noted with sulcal effacement 

and mild rightward midline shift of 2 mm. Primary glial neoplasm or metastatic 

disease are differential considerations. Neurosurgical consultation and follow-

up MRI of the brain with and without contrast recommended. 2. 11 x 4 mm focal 

area of low-attenuation of the left thalamus medial to the mass may reflect 

reactive edema or small infarction. 3. Atrophy with chronic microvascular 

ischemic changes." Dr. Anne Flores has sent the patient [facility] for direct admission. During the course of the admission, the patient 

was seen by Dr. Ja Ibanez for medical oncology and by radiation oncology.  

The general consensus was that the patient had a large solitary hemorrhagic 

metastasis that require evaluation by a neurosurgeon for potential craniotomy 

and resection and the patient was transferred to [facility]





Interim History


The patient was transferred to [facility] where he underwent 

craniotomy on 12/04/2017.  This pathology revealed metastatic adenocarcinoma.  

Pathology specimen [id number].  Since surgery he has intermittent headache.  

This can be up to level VII.  He uses hydrocodone which does relieve the pain.  

Usually takes 2 pills per day.  His wife regular gives him one at bedtime to 

help him rest.  He has occasional nausea.  There is been no episodes of 

vomiting.  He denies any change in vision.  There is no dizziness or 

lightheadedness.  He has noted no focal weakness of the upper or lower 

extremities.  He has noticed a tremor since the surgery.  This is just in his 

hands.





Allergies


Coded Allergies:  


     Iodinated Diagnostic Agents (Verified  Adverse Reaction, Intermediate, 

HYPOTENSION, 12/1/17)





Home Medications


Scheduled


Cholecalciferol (Vitamin D3), 1,000 INTUNIT PO QAM


Fluoxetine (Prozac), 40 MG PO QAM


Omeprazole (Omeprazole), 20 MG PO BID


Simvastatin (Zocor), 40 MG PO QAM


Stool Softener (Stool Softener), 2 TAB PO QAM


Tamsulosin Hcl (Flomax), 1 CAP PO DAILY





Scheduled PRN


Simethicone (Qc Anti-Gas Ultra Strengt), 1 for prn





Review of Systems


Gastrointestinal:  


   Symptoms:  Nausea, Vomiting, Constipation


   GI Comments:  last BMyesterday taking citrcel and stool softner occ n/v, abd 

discomfort


Oral:  


   Symptoms:  No Problems


Respiratory:  


   Symptoms:  Productive Cough


   Sputum Character:  white


   Other Respiratory:  has cold at present


Urinary:  


   Symptoms:  Charles Catheter


   Comments:  charles putting in prior to surgery,removed diff voiding reinserted


Skin:  


   Symptoms:  No Problems


   Other Skin Symptoms:  left crainotomy incision healing





Physical Exam





Vital Signs








  Date Time  Temp Pulse Resp B/P (MAP) Pulse Ox O2 Delivery O2 Flow Rate FiO2


 


12/20/17 09:30 36.9 78 20 148/83 95   








Fatigue:  Mild


General Appearance:  no apparent distress


Eyes:  normal inspection, EOMI


ENT:  hearing grossly normal, pharynx normal, + pertinent finding (he has 

tenderness in the area of the left TMJ and he has trismus with opening his mouth

)


Neck:  no adenopathy, thyroid normal


Respiratory/Chest:  lungs clear, no respiratory distress, no accessory muscle 

use


Cardiovascular:  regular rate, rhythm, no gallop, no murmur


Abdomen:  non tender, soft, no organomegaly


Neurologic/Psychiatric:  CNs II-XII nml as tested, no motor/sensory deficits, 

alert, normal mood/affect


Skin:  warm/dry





Pain Management


Patient Reports Pain:  Yes


Pain Location:  headache


Patient Preferred Pain Scale:  0 - 10


Initial Pain Intensity:  7.0


Pain Management Plan


He takes hydrocodone for his headaches.  One or 2 pills daily.





Laboratory


Laboratory Results:  not applicable





Pathology


Pathology Results:  were reviewed, and pertinent findings noted in HPI


Pathology Comments


Reviewed in the interim history.





Imaging


Imaging Studies:  were reviewed, and pertinent findings noted in HPI





Assessment & Plan (Attending)


Mr. Aranda is a 77-year-old gentleman who presents with metastatic esophageal 

cancer to the brain status post resection on 12/03/2017 by Dr. Mabry ([facility]).  The patient was seen by Dr. Trujillo from neuro-

oncology while in the inpatient setting who recommended consideration of 

postoperative radiation therapy.  The patient was discharged from the hospital 

and we are now seeing him back in follow-up evaluation.  The patient's medical 

oncologist was Dr. Campos Cuenca who is retiring from service and the patient is 

currently being referred to Dr. Sulaiman Miller from cancer care [facility].





Based on the large size of the tumor and the operative report from Dr. Mabry, we 

have recommended consideration of radiation therapy to the postoperative bed.  

We will obtain an MRI of the brain in order to better visualize if there is any 

residual disease (patient cannot tolerate IV contrast but can tolerate 

gadolinium contrast).  The patient will then be scheduled for a CT summation 

for treatment planning.





We explained the indications, alternatives, benefits, risks and side effects of 

external beam radiation therapy to the brain.  We explain the most common side 

effects including but not limited to skin erythema, skin break down, hair loss, 

radiation necrosis, fatigue, short-term memory loss, decreased neurocognitive 

performance, cerebral edema, hearing loss, damage to cochlea structures, 

seizures, loss of sensory and or motor function.  We explained the treatment 

planning process and what to expect before during and after treatment.  The 

patient understands and would be willing to consent to treatment.





The patient and family had multiple questions which were answered to their full 

satisfaction.





Thank you for allowing us to participate in the care of this patient. 





This chart was completed in part utilizing Dragon Speech Voice Recognition 

software. Attempts were made to minimize the grammatical errors, random word 

insertions, pronoun errors and incomplete sentences. Any formal questions or 

concerns about the content, text or information contained within the body of 

this dictation should be directly addressed to the provider for clarification.





Joe Boykin MD


Department of Radiation Oncology


[facility]





Total Time


In Follow-Up


I spent 20 minutes speaking to the patient and performing examination.  I spent 

15 minutes reviewing information and completeness note.





Total Time (Attending)


In Follow-Up


I spent 30 minutes examining and counseling the patient. I spent 15 minutes 

completing this note. 





Copy To


Alexander Joyner M.D.; Brandin Trujillo M.D.; Ervin Mabry MD

## 2018-01-05 NOTE — DIAGNOSTIC IMAGING REPORT
BRAIN COMBO



CLINICAL HISTORY: 77 years-old Male presenting with MET ESOPHAGEAL TO BRAIN

C79.31. 



TECHNIQUE: Multisequence, multiplanar MR imaging of the brain was performed

before and after the administration of intravenous contrast. IV contrast: 6.5 mL

of Gadavist.



COMPARISON: 12/1/2017.



FINDINGS: 

Postsurgical changes of left pterional craniotomy. Laminar T1 hyperintense, T2

hyperintense extra-axial fluid collection noted subjacent to the craniotomy site

measuring 6 mm in thickness, which demonstrates restricted diffusion in the

anterior portion (series 4 image 13; series 400 and image 13), most consistent

with postsurgical blood products. Minimal subjacent mass effect on the left

frontal convexity. 



Subjacent to the craniotomy site is postsurgical change from evacuation of the

large left anterior temporal hemorrhagic mass. Significant interval reduction in

intrinsic T1 hyperintensity in this region, suggesting residual blood products.

Allowing for the presence of intrinsic T1 hyperintensity, which degrades

evaluation of postcontrast imaging, there is nodular enhancement at the

resection site (series 8 image 7). This measures approximately 1.7 cm in maximal

diameter and is predominantly peripheral, abutting the overlying dura, which

also demonstrates pachymeningeal enhancement. Pachymeningeal enhancement extends

to the left frontal convexity and may be postsurgical/reactive. Surrounding

gliosis in the resection cavity.



No new enhancing lesion elsewhere in the brain.



Ventricles and sulci normal in size. Periventricular and subcortical white

matter T2/FLAIR hyperintensity, nonspecific but likely indicative of chronic

small vessel ischemic change. No midline shift. No restricted diffusion to

suggest acute ischemia.  



T2 skull base flow voids preserved.    



Bone marrow signal intensity within the calvarium within normal limits.



IMPRESSION:  

1.  Postsurgical changes in the left temporal lobe status post resection of the

right temporal lobe hemorrhagic mass. Significant nodular enhancement persists

in this region concerning for residual disease. Continued imaging follow-up is

warranted. Postsurgical small extra-axial hematoma in the surgical bed.

Pachymeningeal enhancement could be reactive and does not necessarily early

indicate spread of disease. No new lesion. 



2.  No acute intracranial pathology.







Electronically signed by:  Luca Macias M.D.

1/5/2018 11:38 AM



Dictated Date/Time:  1/5/2018 11:22 AM

## 2018-02-10 NOTE — EMERGENCY ROOM VISIT NOTE
History


Report prepared by Tristan:  Gregory Boo


Under the Supervision of:  Dr. Anatoliy Pisano M.D.


First contact with patient:  11:47


Chief Complaint:  ABDOMINAL PAIN


Stated Complaint:  ABD PAIN


Nursing Triage Summary:  


pt reports abdominal pain in RLQ X 3 days increased today with tenderness worse 


today , family PCP sent in for CT DT pt hx diverticulitis





denies vomitting





History of Present Illness


The patient is a 77 year old male who presents to the Emergency Room with 

complaints of severe, constant, left sided abdominal pain which began earlier 

today. The patient notes a history of diverticulitis and GERD. He denies 

shortness of breath, but his wife states he has been experiencing SOB which 

worsens with movement or talking. He denies any dark black stools in the past 

week, blood in his urine, taking blood thinners, loss of consciousness, fevers, 

nausea, vomiting, and coughing up blood. The patient is has undergone 3 

chemotherapy treatments, radiation, and brain surgery for his esophageal cancer 

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 He notes his brain surgery was performed in 

[place] on December 3rd 2017.





   Source of History:  patient


   Onset:  Earlier Today.


   Position:  abdomen (left sided )


   Symptom Intensity:  severe


   Timing:  constant


   Associated Symptoms:  No LOC, No fevers, No SOB, No nausea, No vomiting


Note:


Denies: Dark black stools in the past week, blood in his urine, taking blood 

thinners, and coughing up blood.





Review of Systems


See HPI for pertinent positives and negatives.  A total of ten systems were 

reviewed and were otherwise negative.





Past Medical & Surgical





Diverticulitis





Dyspnea





METASTATIC ESOPHAGEAL CA, BRAIN METS





Family History





Patient reports no known family medical history.





Social History


Smoking Status:  Never Smoker


Drug Use:  none


Marital Status:  


Housing Status:  lives with family





Current/Historical Medications


Scheduled


Baclofen (Baclofen), 5 MG PO BID


Cholecalciferol (Vitamin D3), 2,000 UNITS PO QAM


Ciprofloxacin Hcl (Cipro), 500 MG PO BID


Dexamethasone (Decadron), 20 MG PO UD


Dutasteride (Avodart), 1 CAP PO DAILY


Finasteride (Proscar), 5 MG PO QAM


Fluoxetine (Prozac), 40 MG PO QAM


Lorazepam (Ativan), 1 MG PO HS


Melatonin (Melatonin), 10-20 MG PO HS


Metronidazole (Flagyl), 500 MG PO TID


Omeprazole (Omeprazole), 20 MG PO BID


Simvastatin (Zocor), 40 MG PO QAM


Tamsulosin Hcl (Flomax), 0.4 MG PO QAM





Scheduled PRN


Ondansetron Hcl (Zofran), 8 MG PO Q8H PRN for Nausea


Polyethylene Glycol 3350 (Miralax), 17 GM PO DAILY PRN for Constipation


Psyllium (Metamucil), 1 TBS PO UD PRN for Constipation


Senna (Senokot), 1 TAB PO UD PRN for Constipation


Sennosides-Docusate Sodium (Stool Softener), 1 TAB PO DAILY PRN for Constipation





Allergies


Coded Allergies:  


     Iodinated Diagnostic Agents (Verified  Adverse Reaction, Intermediate, 

HYPOTENSION, 2/10/18)





Physical Exam


Vital Signs











  Date Time  Temp Pulse Resp B/P (MAP) Pulse Ox O2 Delivery O2 Flow Rate FiO2


 


2/10/18 16:10  82 18 118/70 96 Room Air  


 


2/10/18 14:42 37.4 82 18 131/78 95 Room Air  


 


2/10/18 12:40  89 18 160/85 97 Room Air  


 


2/10/18 11:37 36.9 92 20 147/103 94 Room Air  











Physical Exam


Physical Exam 


GENERAL:  He is oriented to person, place, and time. He appears well-developed 

and well-nourished. He does not appear distressed. ____


HENT:  Exam performed. 


   Head:  Normocephalic and atraumatic. 


   Right Ear:  External ear normal. No mastoid tenderness. 


   Left Ear: External ear normal. No mastoid tenderness. 


   Mouth/Throat:  The oropharynx is clear and moist. No trismus in the jaw. No 

dental abscesses or uvula swelling. No oropharyngeal exudate or tonsillar 

abscesses. ____


EYES: Conjunctivae and EOM are normal. Pupils are equal, round, and reactive to 

light. Right eye exhibits no discharge. Left eye exhibits no discharge. No 

scleral icterus. ____


NECK: Normal range of motion. Neck supple. No JVD present. No spinous process 

tenderness present. No carotid bruit present. No rigidity. No tracheal 

deviation and normal range of motion present. No Brudzinski's sign and no Kernig

's sign noted. ____


CV: Normal rate, regular rhythm, normal heart sounds and intact distal pulses. 

There is no peripheral edema. Palpable radial pulses bue.  ____


PULM/CHEST:  Effort normal and breath sounds normal. No respiratory distress. 

No stridor. He has no wheezes. He has no rales. 


   Chest Wall:  He exhibits no tenderness. ____


ABD: The abdomen is soft. Bowel sounds are normal. He has no distension. No 

mass is present. Tenderness to palpation of LLQ. There is no rebound, no 

guarding, no Card's sign and no tenderness at McBurney's point. Rovsig 

negative ________


MUSC/SKEL: Normal range of motion. There is no peripheral edema, tenderness or 

deformity. ________


LYMPH: No cervical adenopathy. ____


NEURO: He is alert and oriented to person, place, and time. He has normal 

strength. No cranial nerve deficit or sensory deficit. Coordination and gait 

normal. GCS eye subscore is 4. GCS verbal subscore is 5. GCS motor subscore is 

6. cerbellar tests wnl. ____


SKIN: Skin is warm and dry. He is not diaphoretic. ____


PSYCH: He has a normal mood and affect. His behavior is normal. Judgment and 

thought content normal. ____





Medical Decision & Procedures


ER Provider


Diagnostic Interpretation:


Radiology results as stated below per my review and radiologist interpretation: 





ABD/PELVIS ORAL CONT ONLY





CLINICAL HISTORY: 77 years-old Male presenting with llq pain . 





TECHNIQUE: Multidetector CT of the abdomen and pelvis was performed without the


use of intravenous contrast. IV contrast: None. A dose lowering technique was


used consistent with the principles of ALARA (as low as reasonably achievable). 





COMPARISON: 1/2/2015 and PET/CT from 11/1/2017.





CT DOSE (mGy.cm): The estimated cumulative dose is 567.19 mGycm.





FINDINGS:





 topogram: Unremarkable.





Lung bases: Bilateral gynecomastia. Extensive dependent consolidation and


peribronchovascular consolidation in the right lower lobe with associated volume


loss. Less extensive dependent changes in the left lower lobe. Solid round


pulmonary nodule measuring 11 mm in the right middle lobe. This has increased in


size since prior PET/CT on 11/1/2017. Multiple additional smaller pulmonary


nodules marked on the images. Aortic valve and coronary artery calcification.


Normal heart size. The intraventricular blood pool is less dense than the


adjacent myocardium consistent with anemia. Subendocardial fat deposition noted


in the left ventricle, nonspecific. No pericardial or pleural effusion. 





Liver: Normal morphology. Well-defined hypodensity in the right hepatic lobe


indeterminate but likely hepatic cysts. Few additional smaller hypodensities.


One hypodensity more inferiorly in the right hepatic lobe is more indistinct and


indeterminate (series 3 image 153).





Biliary: No intrahepatic or extrahepatic biliary ductal dilatation. Physiologic


distention of the gallbladder.





Pancreas: Herniation of a portion of the pancreatic body and tail through the


aortic hiatus.





Spleen: Normal noncontrast appearance.





Adrenal glands: Normal noncontrast appearance.





Kidneys and ureters: Multiple parapelvic cysts bilaterally. Dominant exophytic


6.3 cm cyst in the right kidney, incompletely characterized. Additional smaller


exophytic cyst suspected at the lower pole the right kidney. Punctate


nonobstructing right renal calculus (series 3 image 217). No hydronephrosis.





Bladder: Normal.





Pelvic organs: Prostate enlargement likely secondary to benign prostatic


hyperplasia.





Bowel: Diverticulosis of the distal descending and proximal sigmoid colon.


Associated wall thickening and pericolonic inflammatory change in the distal


descending colon. No adjacent fluid collection or nick perforation. Associated


fascial thickening evidence of peritoneal reaction. No bowel obstruction.


Postsurgical changes of gastric pull-through.





Peritoneal cavity: No free fluid or intraperitoneal gas.





Lymph nodes: No gross lymphadenopathy allowing for noncontrast technique.





Vasculature: Atherosclerosis of the normal caliber abdominal aorta.





Abdominal wall: Postsurgical changes of the supraumbilical midline abdominal


wall. Small fat-containing ventral midline hernia (series 3 image 221).


Intramuscular lipoma noted in the right abdominal wall musculature.





Musculoskeletal: Degenerative changes of the spine.





IMPRESSION:


1.  Findings consistent with acute uncomplicated diverticulitis at the junction


of the descending and sigmoid colon. No abscess or CT evidence of nick


perforation.





2.  Extensive peribronchovascular and dependent consolidation in the right lower


lobe with volume loss. This could represent extensive atelectasis although


chronic aspiration or extensive scarring could appear similarly. This is


unchanged from prior.





3.  Interval increase in size of the now 11 mm solid pulmonary nodule in the


right middle lobe. It is difficult to assess if the multiple additional smaller


solid pulmonary nodules are new given respiratory artifact on prior PET/CT.


Findings suspicious for pulmonary metastatic disease in the setting of


esophageal cancer.





4.  Postsurgical changes of gastric pull-through with chronic partial herniation


of the pancreatic neck-body through the aortic hiatus.





The report will be called/faxed according to standard departmental protocol.

















Electronically signed by:  Luca Macias M.D.


2/10/2018 3:00 PM





Dictated Date/Time:  2/10/2018 2:46 PM





CHEST 2 VIEWS ROUTINE





CLINICAL HISTORY: 77 years-old Male presenting with sob epigastric pain. 





TECHNIQUE: PA and lateral views of the chest were obtained.





COMPARISON: 8/16/2017.





FINDINGS:


Atherosclerosis of the aortic arch. Cardiac silhouette normal in size. Minimal


nodular opacities in the left lung base. Apparent nodular opacity at the right


lung base. No other focal infiltrate. No large effusion or pneumothorax. Osseous


structures normal. Upper abdomen normal.





IMPRESSION:


1.  Minimal nodular opacities at the left lung base, possibly atelectasis or


scarring though new from prior.





2.  Apparent nodular opacity at the right lung base may represent a prominent


nipple shadow. If there is clinical concern, repeat PA radiograph with nipple


markers.











Electronically signed by:  Luca Macias M.D.


2/10/2018 12:40 PM





Dictated Date/Time:  2/10/2018 12:39 PM





Laboratory Results


2/10/18 12:00








Red Blood Count 4.06, Mean Corpuscular Volume 90.1, Mean Corpuscular Hemoglobin 

28.8, Mean Corpuscular Hemoglobin Concent 32.0, Mean Platelet Volume 9.6, 

Neutrophils (%) (Auto) 52.5, Lymphocytes (%) (Auto) 41.4, Monocytes (%) (Auto) 

5.7, Eosinophils (%) (Auto) 0.4, Basophils (%) (Auto) 0.0, Neutrophils # (Auto) 

1.19, Lymphocytes # (Auto) 0.94, Monocytes # (Auto) 0.13, Eosinophils # (Auto) 

0.01, Basophils # (Auto) 0.00





2/10/18 12:00

















Test


  2/10/18


12:00 2/10/18


12:15 2/10/18


12:35 2/10/18


15:22


 


White Blood Count


  2.27 K/uL


(4.8-10.8) 


  


  


 


 


Red Blood Count


  4.06 M/uL


(4.7-6.1) 


  


  


 


 


Hemoglobin


  11.7 g/dL


(14.0-18.0) 


  


  


 


 


Hematocrit 36.6 % (42-52)    


 


Mean Corpuscular Volume


  90.1 fL


() 


  


  


 


 


Mean Corpuscular Hemoglobin


  28.8 pg


(25-34) 


  


  


 


 


Mean Corpuscular Hemoglobin


Concent 32.0 g/dl


(32-36) 


  


  


 


 


Platelet Count


  276 K/uL


(130-400) 


  


  


 


 


Mean Platelet Volume


  9.6 fL


(7.4-10.4) 


  


  


 


 


Neutrophils (%) (Auto) 52.5 %    


 


Lymphocytes (%) (Auto) 41.4 %    


 


Monocytes (%) (Auto) 5.7 %    


 


Eosinophils (%) (Auto) 0.4 %    


 


Basophils (%) (Auto) 0.0 %    


 


Neutrophils # (Auto)


  1.19 K/uL


(1.4-6.5) 


  


  


 


 


Lymphocytes # (Auto)


  0.94 K/uL


(1.2-3.4) 


  


  


 


 


Monocytes # (Auto)


  0.13 K/uL


(0.11-0.59) 


  


  


 


 


Eosinophils # (Auto)


  0.01 K/uL


(0-0.5) 


  


  


 


 


Basophils # (Auto)


  0.00 K/uL


(0-0.2) 


  


  


 


 


RDW Standard Deviation


  43.6 fL


(36.4-46.3) 


  


  


 


 


RDW Coefficient of Variation


  13.6 %


(11.5-14.5) 


  


  


 


 


Immature Granulocyte % (Auto) 0.0 %    


 


Immature Granulocyte # (Auto)


  0.00 K/uL


(0.00-0.02) 


  


  


 


 


Prothrombin Time


  9.7 SECONDS


(9.0-12.0) 


  


  


 


 


Prothromb Time International


Ratio 0.9 (0.9-1.1) 


  


  


  


 


 


Activated Partial


Thromboplast Time 24.8 SECONDS


(21.0-31.0) 


  


  


 


 


Partial Thromboplastin Ratio 1.0    


 


Anion Gap


  5.0 mmol/L


(3-11) 


  


  


 


 


Est Creatinine Clear Calc


Drug Dose 45.6 ml/min 


  


  


  


 


 


Estimated GFR (


American) 61.9 


  


  


  


 


 


Estimated GFR (Non-


American 53.4 


  


  


  


 


 


BUN/Creatinine Ratio 20.2 (10-20)    


 


Calcium Level


  8.1 mg/dl


(8.5-10.1) 


  


  


 


 


Total Bilirubin


  0.5 mg/dl


(0.2-1) 


  


  


 


 


Aspartate Amino Transf


(AST/SGOT) 19 U/L (15-37) 


  


  


  


 


 


Alanine Aminotransferase


(ALT/SGPT) 27 U/L (12-78) 


  


  


  


 


 


Alkaline Phosphatase


  91 U/L


() 


  


  


 


 


Total Protein


  6.5 gm/dl


(6.4-8.2) 


  


  


 


 


Albumin


  3.0 gm/dl


(3.4-5.0) 


  


  


 


 


Globulin


  3.5 gm/dl


(2.5-4.0) 


  


  


 


 


Albumin/Globulin Ratio 0.9 (0.9-2)    


 


Lipase


  232 U/L


() 


  


  


 


 


Lactic Acid Level


  


  1.0 mmol/L


(0.4-2.0) 


  


 


 


Urine Color   DK YELLOW  


 


Urine Appearance   CLEAR (CLEAR)  


 


Urine pH   5.0 (4.5-7.5)  


 


Urine Specific Gravity


  


  


  1.029


(1.000-1.030) 


 


 


Urine Protein   NEG (NEG)  


 


Urine Glucose (UA)   NEG (NEG)  


 


Urine Ketones   NEG (NEG)  


 


Urine Occult Blood   TRACE (NEG)  


 


Urine Nitrite   NEG (NEG)  


 


Urine Bilirubin   NEG (NEG)  


 


Urine Urobilinogen   NEG (NEG)  


 


Urine Leukocyte Esterase   NEG (NEG)  


 


Urine WBC (Auto)   1-5 /hpf (0-5)  


 


Urine RBC (Auto)


  


  


  5-10 /hpf


(0-4) 


 


 


Urine Hyaline Casts (Auto)   1-5 /lpf (0-5)  


 


Urine Epithelial Cells (Auto)


  


  


  5-10 /lpf


(0-5) 


 


 


Urine Bacteria (Auto)   NEG (NEG)  


 


Troponin I


  


  


  


  < 0.015 ng/ml


(0-0.045)





Laboratory results reviewed by me





Medications Administered











 Medications


  (Trade)  Dose


 Ordered  Sig/Petra


 Route  Start Time


 Stop Time Status Last Admin


Dose Admin


 


 Sodium Chloride  1,000 ml @ 


 125 mls/hr  Q8H STAT


 IV  2/10/18 11:57


 2/10/18 17:20 DC 2/10/18 12:16


125 MLS/HR


 


 Aspirin


  (Aspirin Chew)  324 mg  NOW  STAT


 PO  2/10/18 11:57


 2/10/18 12:01 DC 2/10/18 12:13


324 MG


 


 Morphine Sulfate


  (MoRPHine


 SULFATE INJ)  4 mg  STK-MED ONCE


 .ROUTE  2/10/18 13:16


 2/10/18 13:17 DC 2/10/18 13:17


4 MG


 


 Ciprofloxacin


  (Cipro Tab)  500 mg  NOW  STAT


 PO  2/10/18 15:31


 2/10/18 15:33 DC 2/10/18 15:39


500 MG


 


 Metronidazole


  (Flagyl Tab)  500 mg  NOW  STAT


 PO  2/10/18 15:31


 2/10/18 15:33 DC 2/10/18 15:39


500 MG











ECG


Indication:  abdominal pain


Rate (beats per minute):  86


Rhythm:  sinus rhythm


Findings:  other (TX, QRS, QTc is wnl. No ST depression or elevation. LVH)


Change:


Patient's electrocardiogram was interpreted by me.





ED Course


1152: The patient was evaluated in room C01. A complete history and physical 

exam was performed.





1157: Ordered Aspirin 324mg PO and Sodium Chloride 1000 ml @ 125 mls/hr IV.





1316: Ordered Morphine Sulfate 4mg 





1402: I reevaluated the patient. The patient's vitals are stable. His CT was 

negative for diverticulitis with no abscess. Two sets of troponin tests were 

negative.  White count decreased, most likely due to the chemotherapy. will 

discharge with antibiotics and he will follow up with PCP.  First dose of 

antibiotics given in the emergency department.  Patient tolerated p.o. in the 

emergency department.  DISCHARGE - Plan of care discussed with patient and 

questions answered. The patient was given both verbal and printed discharge 

instructions. The patient verbalized understanding and ability to comply. The 

patient is to seek outpatient follow up as noted in the discharge instructions. 

The patient verbalized understanding and ability to comply. The patient is 

discharged in stable condition. The patient was instructed to return for 

worsening symptoms.





1531: Ordered metronidazole 500mg PO and Ciprofloxacin 500mg PO.





Medical Decision


 I reevaluated the patient. The patient's vitals are stable. His CT was 

negative for diverticulitis with no abscess. Two sets of troponin tests were 

negative.  White count decreased, most likely due to the chemotherapy. will 

discharge with antibiotics and he will follow up with PCP.  First dose of 

antibiotics given in the emergency department.  Patient tolerated p.o. in the 

emergency department.  DISCHARGE - Plan of care discussed with patient and 

questions answered. The patient was given both verbal and printed discharge 

instructions. The patient verbalized understanding and ability to comply. The 

patient is to seek outpatient follow up as noted in the discharge instructions. 

The patient verbalized understanding and ability to comply. The patient is 

discharged in stable condition. The patient was instructed to return for 

worsening symptoms.





Medication Reconcilliation


Current Medication List:  was personally reviewed by me





Blood Pressure Screening


Patient's blood pressure:  Normal blood pressure





Impression





 Primary Impression:  


 Diverticulitis


 Additional Impression:  


 Dyspnea





Scribe Attestation


The scribe's documentation has been prepared under my direction and personally 

reviewed by me in its entirety. I confirm that the note above accurately 

reflects all work, treatment, procedures, and medical decision making performed 

by me.





The chart was completed utilizing Dragon Speech voice recognition software. 

Grammatical errors, random word insertions, pronoun errors, and incomplete 

sentences are an occasional consequence of this system due to software 

limitations, ambient noise, and hardware issues. Any formal questions or 

concerns about the content, text, or information contained within the body of 

this dictation should be directly addressed to the physician for clarification.





Departure Information


Dispostion


Home / Self-Care





Prescriptions





Metronidazole (FLAGYL) 500 Mg Tab


500 MG PO TID for 7 Days, #21 TAB


   Prov: Anatoliy Pisano M.D.         2/10/18 


Ciprofloxacin Hcl (CIPRO) 500 Mg Tab


500 MG PO BID, #14 TAB


   Prov: Anatoliy Pisano M.D.         2/10/18





Referrals


Alexander Joyner M.D.





Forms


Call Back Authorization, HOME CARE DOCUMENTATION FORM,                         

                                       IMPORTANT VISIT INFORMATION





Patient Instructions


Diverticulitis [place], [facility]





Additional Instructions





Return to the emergency department if you develop fever greater than 100.4, 

vomiting, constipation, blood in your urine, blood in her stools, chest pain or 

your symptoms worsen





Problem Qualifiers








 Additional Impression:  


 Dyspnea


 Dyspnea type:  unspecified  Qualified Codes:  R06.00 - Dyspnea, unspecified

## 2018-02-10 NOTE — DIAGNOSTIC IMAGING REPORT
ABD/PELVIS ORAL CONT ONLY



CLINICAL HISTORY: 77 years-old Male presenting with llq pain . 



TECHNIQUE: Multidetector CT of the abdomen and pelvis was performed without the

use of intravenous contrast. IV contrast: None. A dose lowering technique was

used consistent with the principles of ALARA (as low as reasonably achievable). 



COMPARISON: 1/2/2015 and PET/CT from 11/1/2017.



CT DOSE (mGy.cm): The estimated cumulative dose is 567.19 mGycm.



FINDINGS:



 topogram: Unremarkable.



Lung bases: Bilateral gynecomastia. Extensive dependent consolidation and

peribronchovascular consolidation in the right lower lobe with associated volume

loss. Less extensive dependent changes in the left lower lobe. Solid round

pulmonary nodule measuring 11 mm in the right middle lobe. This has increased in

size since prior PET/CT on 11/1/2017. Multiple additional smaller pulmonary

nodules marked on the images. Aortic valve and coronary artery calcification.

Normal heart size. The intraventricular blood pool is less dense than the

adjacent myocardium consistent with anemia. Subendocardial fat deposition noted

in the left ventricle, nonspecific. No pericardial or pleural effusion. 



Liver: Normal morphology. Well-defined hypodensity in the right hepatic lobe

indeterminate but likely hepatic cysts. Few additional smaller hypodensities.

One hypodensity more inferiorly in the right hepatic lobe is more indistinct and

indeterminate (series 3 image 153).



Biliary: No intrahepatic or extrahepatic biliary ductal dilatation. Physiologic

distention of the gallbladder.



Pancreas: Herniation of a portion of the pancreatic body and tail through the

aortic hiatus.



Spleen: Normal noncontrast appearance.



Adrenal glands: Normal noncontrast appearance.



Kidneys and ureters: Multiple parapelvic cysts bilaterally. Dominant exophytic

6.3 cm cyst in the right kidney, incompletely characterized. Additional smaller

exophytic cyst suspected at the lower pole the right kidney. Punctate

nonobstructing right renal calculus (series 3 image 217). No hydronephrosis.



Bladder: Normal.



Pelvic organs: Prostate enlargement likely secondary to benign prostatic

hyperplasia.



Bowel: Diverticulosis of the distal descending and proximal sigmoid colon.

Associated wall thickening and pericolonic inflammatory change in the distal

descending colon. No adjacent fluid collection or nick perforation. Associated

fascial thickening evidence of peritoneal reaction. No bowel obstruction.

Postsurgical changes of gastric pull-through.



Peritoneal cavity: No free fluid or intraperitoneal gas.



Lymph nodes: No gross lymphadenopathy allowing for noncontrast technique.



Vasculature: Atherosclerosis of the normal caliber abdominal aorta.



Abdominal wall: Postsurgical changes of the supraumbilical midline abdominal

wall. Small fat-containing ventral midline hernia (series 3 image 221).

Intramuscular lipoma noted in the right abdominal wall musculature.



Musculoskeletal: Degenerative changes of the spine.



IMPRESSION:

1.  Findings consistent with acute uncomplicated diverticulitis at the junction

of the descending and sigmoid colon. No abscess or CT evidence of nick

perforation.



2.  Extensive peribronchovascular and dependent consolidation in the right lower

lobe with volume loss. This could represent extensive atelectasis although

chronic aspiration or extensive scarring could appear similarly. This is

unchanged from prior.



3.  Interval increase in size of the now 11 mm solid pulmonary nodule in the

right middle lobe. It is difficult to assess if the multiple additional smaller

solid pulmonary nodules are new given respiratory artifact on prior PET/CT.

Findings suspicious for pulmonary metastatic disease in the setting of

esophageal cancer.



4.  Postsurgical changes of gastric pull-through with chronic partial herniation

of the pancreatic neck-body through the aortic hiatus.



The report will be called/faxed according to standard departmental protocol.











Electronically signed by:  Luca Macias M.D.

2/10/2018 3:00 PM



Dictated Date/Time:  2/10/2018 2:46 PM

## 2018-02-10 NOTE — DIAGNOSTIC IMAGING REPORT
CHEST 2 VIEWS ROUTINE



CLINICAL HISTORY: 77 years-old Male presenting with sob epigastric pain. 



TECHNIQUE: PA and lateral views of the chest were obtained.



COMPARISON: 8/16/2017.



FINDINGS:

Atherosclerosis of the aortic arch. Cardiac silhouette normal in size. Minimal

nodular opacities in the left lung base. Apparent nodular opacity at the right

lung base. No other focal infiltrate. No large effusion or pneumothorax. Osseous

structures normal. Upper abdomen normal.



IMPRESSION:

1.  Minimal nodular opacities at the left lung base, possibly atelectasis or

scarring though new from prior.



2.  Apparent nodular opacity at the right lung base may represent a prominent

nipple shadow. If there is clinical concern, repeat PA radiograph with nipple

markers.







Electronically signed by:  Luca Macias M.D.

2/10/2018 12:40 PM



Dictated Date/Time:  2/10/2018 12:39 PM

## 2018-02-19 NOTE — GI REPORT
Procedure Date: 2/19/2018 3:48 PM

Procedure:            Upper GI endoscopy

Indications:          Dysphagia

Medicines:            Monitored Anesthesia Care

Complications:        No immediate complications.

Estimated Blood Loss: Estimated blood loss: none.

Procedure:            Pre-Anesthesia Assessment:

                      - Prior to the procedure, a History and Physical was 

                      performed, and patient medications and allergies were 

                      reviewed. The patient's tolerance of previous 

                      anesthesia was also reviewed. The risks and benefits of 

                      the procedure and the sedation options and risks were 

                      discussed with the patient. All questions were 

                      answered, and informed consent was obtained. Prior 

                      Anticoagulants: The patient has taken no previous 

                      anticoagulant or antiplatelet agents. ASA Grade 

                      Assessment: III - A patient with severe systemic 

                      disease. After reviewing the risks and benefits, the 

                      patient was deemed in satisfactory condition to undergo 

                      the procedure.

                      After obtaining informed consent, the endoscope was 

                      passed under direct vision. Throughout the procedure, 

                      the patient's blood pressure, pulse, and oxygen 

                      saturations were monitored continuously. The scope was 

                      introduced through the mouth, and advanced to the 

                      second part of duodenum. The upper GI endoscopy was 

                      accomplished without difficulty. The patient tolerated 

                      the procedure well.

Findings:

     One benign-appearing, intrinsic stenosis was found. The EG junction was 

     at 18 cm from the incisors, secondary to prior gastric pull-up. This 

     stenosis was moderately severe and measured 1.2 cm (inner diameter) x 

     less than one cm (in length). The stenosis was traversed. A TTS dilator 

     was passed through the scope. Dilation with a 15-16.5-18 mm balloon 

     dilator was performed to 18 mm. The dilation site was examined and 

     showed moderate improvement in luminal narrowing.

     The stomach was normal.

     The examined duodenum was normal.

Impression:           - Benign-appearing esophageal stenosis. Dilated.

                      - Normal stomach.

                      - Normal examined duodenum.

                      - No specimens collected.

Recommendation:       - Resume previous diet.

                      - Continue present medications.

                      - Repeat upper endoscopy PRN for retreatment.

                      - Return to primary care physician as previously 

                      scheduled.

Michael Gandhi, DO

2/19/2018 4:17:13 PM

This report has been signed electronically.

Note Initiated On: 2/19/2018 3:48 PM

     I attest to the content of the Intraoperative Record and orders 

     documented therein, exceptions below

## 2018-02-19 NOTE — DISCHARGE INSTRUCTIONS
Endoscopy Patient Instructions


Date / Procedure(s) Performed


Feb 19, 2018.


EGD





Allergy Information


Coded Allergies:  


     Iodinated Diagnostic Agents (Verified  Adverse Reaction, Intermediate, 

HYPOTENSION, 2/19/18)





Discharge Date / Findings


Feb 19, 2018.


Esophageal stricture s/p dilation





Medication Instructions


OK to resume all medications today as prescribed





Reported Home Medications








 Medications  Dose


 Route/Sig


 Max Daily Dose Days Date Category Dose


Instructions


 


 Cipro


  (Ciprofloxacin


 Hcl) 500 Mg Tab  500 Mg


 PO BID


    2/10/18 Rx 


 


 Metamucil


  (Psyllium) 48.57


 % Pow  1 Tbs


 PO UD PRN


    2/10/18 Reported 


 


 Miralax


  (Polyethylene


 Glycol 3350) 1


 Pow Pow  17 Gm


 PO DAILY PRN


    2/10/18 Reported 


 


 Senokot (Senna)


 8.6 Mg Tab  1 Tab


 PO UD PRN


    2/10/18 Reported 


 


 Stool Softener


  (Sennosides-Docusate


 Sodium) 1 Tab Tab  1 Tab


 PO DAILY PRN


    2/10/18 Reported 


 


 Avodart


  (Dutasteride) 0.5


 Mg Cap  1 Cap


 PO DAILY


   30 2/10/18 Reported 


 


 Decadron


  (Dexamethasone) 4


 Mg Tab  20 Mg


 PO UD


    2/6/18 Reported  TO TAKE 20MG 12 HOURS BEFORE CHEMO AND ALSO ANOTHER 20MG 4 

HOURS


 PRIOR TO CHEMO.


 


 Zofran


  (Ondansetron HCl)


 8 Mg Tab  8 Mg


 PO Q8H PRN


    2/6/18 Reported 


 


 Ativan


  (Lorazepam) 0.5


 Mg Tab  1 Mg


 PO HS


    2/6/18 Reported 


 


 Proscar


  (Finasteride) 5


 Mg Tab  5 Mg


 PO QAM


    2/6/18 Reported 


 


 Baclofen 10 Mg Tab  5 Mg


 PO BID


    2/6/18 Reported 


 


 Melatonin 10 Mg


 Tab  10-20 Mg


 PO HS


    2/6/18 Reported 


 


 Vitamin D3


  (Cholecalciferol)


 2,000 Unit Tab  2,000 Units


 PO QAM


    2/6/18 Reported 


 


 Flomax


  (Tamsulosin Hcl)


 0.4 Mg Cap  0.4 Mg


 PO QAM


    12/20/17 Reported 


 


 Omeprazole 20 Mg


 Tab  20 Mg


 PO BID


    6/10/15 Reported 


 


 Zocor


  (Simvastatin) 40


 Mg Tab  40 Mg


 PO QAM


    2/4/08 Reported 


 


 Prozac


  (Fluoxetine HCl)


 40 Mg Cap  40 Mg


 PO QAM


    2/4/08 Reported 











Provider Instructions





Activity Restrictions





-  No exercising or heavy lifting for 24 hours. 


-  Do not drink alcohol the day of the procedure.


-  Do not drive a car or operate machinery until the day after the procedure.


-  Do not make any important decisions or sign important papers in 24 hours 

after the procedure.





Following Day:





-  Return to full activity which may include returning to work/school.





Diet





Start your diet with liquids and light foods (jello, soup, juice, toast).  Then 

eat your usual diet if not nauseated.





Treatment For Common After Affects





For mild abdominal pain, bloating, or excessive gas:





-  Rest


-  Eat lightly


-  Lie on right side





Follow-Up Information


Follow-up with Dr. Miller as scheduled





Anesthesia Information





What You Should Know





You have had a procedure that required some medicine to reduce anxiety and 

discomfort. This treatment is called moderate sedation.  


After receiving the treatment, you may be sleepy, but you will be able to 

breathe on your own.  The effects of the treatment may last for several hours.








Follow these instructions along with Activity/Diet recommendations noted above:





*  Do NOT do anything where dizziness or clumsiness would be dangerous.





*  Rest quietly at home today, then you can be up and about tomorrow.





*  Have a responsible person stay with you the rest of today.





*  You may have had an I.V. today.  If so, you may take the dressing off later 

today.





Recommendations


 


Call your doctor if:





*  Trouble breathing 





*  Continuous vomiting for more than 24 hours








*  Temperature above 101 degrees





*  Severe abdominal pain or bloating





*  Pain not relieved by pain medicine ordered





*  There is increased drainage or redness from any incision





*  A large amount of rectal bleeding greater than 2-3 tablespoons. 


   (If you had a polyp/s removed or have hemorrhoids, a small amount of blood -


    from the rectum is to be expected.)





*  You have any unanswered questions or concerns.








IN THE EVENT OF A SERIOUS EMERGENCY, GO TO THE NEAREST EMERGENCY ROOM








       Your discharge instructions were prepared by provider Michael Gandhi.





 Patient Instructions Signature Page














Campos Aranda 











Patient (or Guardian) Signature/Date:____________________________________ I 

have read and understand the instructions given to me by my caregivers.








Caregiver/RN/Doctor Signature/Date:____________________________________











The above-named patient and/or guardian has received patient instructions on 

this date.





























+  Original Patient Signature Page (only) stays with chart.  Please make copy 

for patient.

## 2018-02-19 NOTE — ENDO HISTORY AND PHYSICAL
History & Physical


Date of Service:


Feb 19, 2018.


Chief Complaint:


h/o esophageal CA, dysphagia


Referring Physician:


Dr. Miller


History of Present Illness


78 yo CM who presents for EGD secondary to dysphagia and history of esophageal 

cancer.





Past Medical History


Reflux, High Cholesterol, Depression





Past Surgical History


Hx Cardiac Surgery:  No


Hx Internal Defibrillator:  No


Hx Pacemaker:  No


Hx Abdominal Surgery:  No


Hx of Implantable Prosthesis:  No


Hx Post-Op Nausea and Vomiting:  No


Hx Cancer Surgery:  Yes (esophageal cancer surgery; CRANIOTOMY)


Hx Thoracic Surgery:  No


Hx Orthopedic:  Yes (carpel tunnel, trigger finger)


Hx Urinary Tract Surgery:  No





Family History


None





Social History


Smoking Status:  Never Smoker


Hx Substance Use:  No


Hx Alcohol Use:  Yes (Occasionally)





Allergies


Coded Allergies:  


     Iodinated Diagnostic Agents (Verified  Adverse Reaction, Intermediate, 

HYPOTENSION, 2/19/18)





Current Medications





Reported Home Medications








 Medications  Dose


 Route/Sig


 Max Daily Dose Days Date Category Dose


Instructions


 


 Cipro


  (Ciprofloxacin


 Hcl) 500 Mg Tab  500 Mg


 PO BID


    2/10/18 Rx 


 


 Metamucil


  (Psyllium) 48.57


 % Pow  1 Tbs


 PO UD PRN


    2/10/18 Reported 


 


 Miralax


  (Polyethylene


 Glycol 3350) 1


 Pow Pow  17 Gm


 PO DAILY PRN


    2/10/18 Reported 


 


 Senokot (Senna)


 8.6 Mg Tab  1 Tab


 PO UD PRN


    2/10/18 Reported 


 


 Stool Softener


  (Sennosides-Docusate


 Sodium) 1 Tab Tab  1 Tab


 PO DAILY PRN


    2/10/18 Reported 


 


 Avodart


  (Dutasteride) 0.5


 Mg Cap  1 Cap


 PO DAILY


   30 2/10/18 Reported 


 


 Decadron


  (Dexamethasone) 4


 Mg Tab  20 Mg


 PO UD


    2/6/18 Reported  TO TAKE 20MG 12 HOURS BEFORE CHEMO AND ALSO ANOTHER 20MG 4 

HOURS


 PRIOR TO CHEMO.


 


 Zofran


  (Ondansetron HCl)


 8 Mg Tab  8 Mg


 PO Q8H PRN


    2/6/18 Reported 


 


 Ativan


  (Lorazepam) 0.5


 Mg Tab  1 Mg


 PO HS


    2/6/18 Reported 


 


 Proscar


  (Finasteride) 5


 Mg Tab  5 Mg


 PO QAM


    2/6/18 Reported 


 


 Baclofen 10 Mg Tab  5 Mg


 PO BID


    2/6/18 Reported 


 


 Melatonin 10 Mg


 Tab  10-20 Mg


 PO HS


    2/6/18 Reported 


 


 Vitamin D3


  (Cholecalciferol)


 2,000 Unit Tab  2,000 Units


 PO QAM


    2/6/18 Reported 


 


 Flomax


  (Tamsulosin Hcl)


 0.4 Mg Cap  0.4 Mg


 PO QAM


    12/20/17 Reported 


 


 Omeprazole 20 Mg


 Tab  20 Mg


 PO BID


    6/10/15 Reported 


 


 Zocor


  (Simvastatin) 40


 Mg Tab  40 Mg


 PO QAM


    2/4/08 Reported 


 


 Prozac


  (Fluoxetine HCl)


 40 Mg Cap  40 Mg


 PO QAM


    2/4/08 Reported 











Vital Signs


Weight (Kilograms):  72.73


Height (Feet):  5


Height (Inches):  7.5











  Date Time  Temp Pulse Resp B/P (MAP) Pulse Ox O2 Delivery O2 Flow Rate FiO2


 


2/19/18 14:59    158/103 (121)    


 


2/19/18 14:57 37 71 18 163/116 (132) 98 Room Air  











Physical Exam


General Appearance:  WD/WN, no apparent distress


Respiratory/Chest:  


   Auscultation:  breath sounds normal


Cardiovascular:  


   Heart Auscultation:  RRR


Abdomen:  


   Bowel Sounds:  normal


   Inspection & Palpation:  soft, non-distended, no tenderness, guarding & 

rebound





Assessment and Plan


Assessment:


78 yo CM who presents for EGD secondary to dysphagia and history of esophageal 

cancer.








Plan:


Proceed with EGD.

## 2018-02-19 NOTE — ANESTHESIOLOGY PROGRESS NOTE
Anesthesia Post Op Note


Date & Time


Feb 19, 2018 at 16:23





Vital Signs


Pain Intensity:  0





Vital Signs Past 12 Hours








  Date Time  Temp Pulse Resp B/P (MAP) Pulse Ox O2 Delivery O2 Flow Rate FiO2


 


2/19/18 16:09  78 16 119/75 (90) 98 Room Air  


 


2/19/18 14:59    158/103 (121)    


 


2/19/18 14:57 37 71 18 163/116 (132) 98 Room Air  











Notes


Mental Status:  alert / awake / arousable, participated in evaluation


Pt Amnestic to Procedure:  Yes


Nausea / Vomiting:  adequately controlled


Pain:  adequately controlled


Airway Patency, RR, SpO2:  stable & adequate


BP & HR:  stable & adequate


Hydration State:  stable & adequate


Anesthetic Complications:  no major complications apparent

## 2018-02-23 NOTE — RADIATION ONCOLOGY FOLLOW-UP
Radiation Oncology Follow-Up


Date of Visit


Feb 23, 2018.





Reason For Visit


One-month follow-up





Radiation Completion Date


finished  radiation therapy to brain on 1-, utilizing  SBRT





Diagnosis





(1) METASTATIC ESOPHAGEAL CA, BRAIN METS


Status:  Acute        Onset Date:  2008


Stage:  IV


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


Status post neoadjuvant radiation and chemotherapy.  Radiation completed 03/05/ 2008 received 4500 cGy


Status post resection April 2008 complete response


PET/CT 01/18/2012 mildly enlarged lymph nodes right chest and supraclavicular 

region


Status post needle aspiration biopsy showing metastatic adenocarcinoma 02/16/ 2012


Systemic chemotherapy


Onset of frequent headaches and finding of brain metastasis


Status post craniotomy with resection 12/12/2017, metastatic adenocarcinoma


Status post completion of radiation therapy 01/23/2018. He received 3000 cGy 

utilizing SBRT  Last Edited By: Kiya Carreno on Jan 29, 2018 16:00





History of Present Illness


Mr. Aranda presents with a history of recurrent metastatic esophageal cancer. 

As per


the wife, the patient was recently going to restart chemotherapy due to 

recurrent disease (medical records from [facility]


are unavailable at the time of consultation). The patient did have previous 

chemotherapy and radiation therapy in the past.


More recently, the patient was complaining of issues with memory. Dr. Anne Flores

, his primary care physician, ordered a


CT of the head without contrast on 12/01/2017 which revealed: "IMPRESSION: 1. 

Heterogeneous hyperattenuating intraaxial


mass containing central calcifications involves the left temporal lobe 

measuring up to 5.2 cm. Extensive associated


vasogenic edema is noted with sulcal effacement and mild rightward midline 

shift of 2 mm. Primary glial neoplasm or


metastatic disease are differential considerations. Neurosurgical consultation 

and follow-up MRI of the brain with and


without contrast recommended. 2. 11 x 4 mm focal area of low-attenuation of the 

left thalamus medial to the mass may


reflect reactive edema or small infarction. 3. Atrophy with chronic 

microvascular ischemic changes." Dr. Anne Flores has


sent the patient [facility] for direct admission. We are now 

being asked to evaluate the patient for


consideration of radiation therapy.


Currently, the patient is stable overall. The patient denies significant 

headaches however he does admit to issues with


short-term memory. He does have some fatigue.





He completed radiation therapy with SBRT 01/23/2018.  He received 3000 cGy in 5 

fractions.





Interim History


Over the past month he does continue to have some intermittent headaches.  

These occur 3 times per week.  The discomfort is up to a level 6.  He has no 

associated nausea.  No photosensitivity.  He will take 1 hydrocodone and the 

pain is relieved.  The intensity has remained the same since over the past 

month.  He is currently scheduled to follow-up with the neuro-oncologist in 

March.  He will be having a MRI on that day in [place].  He was having some 

issues with dysphasia and underwent an upper GI endoscopy and dilatation.  He 

is doing better since that procedure.





Allergies


Coded Allergies:  


     Iodinated Diagnostic Agents (Verified  Adverse Reaction, Intermediate, 

HYPOTENSION, 2/19/18)





Home Medications


Scheduled


Baclofen (Baclofen), 5 MG PO BID


Cholecalciferol (Vitamin D3), 2,000 UNITS PO QAM


Dexamethasone (Decadron), 20 MG PO UD


Dutasteride (Avodart), 1 CAP PO DAILY


Finasteride (Proscar), 5 MG PO QAM


Fluoxetine (Prozac), 40 MG PO QAM


Lorazepam (Ativan), 1 MG PO HS


Melatonin (Melatonin), 10-20 MG PO HS


Omeprazole (Omeprazole), 20 MG PO BID


Simvastatin (Zocor), 40 MG PO QAM


Tamsulosin Hcl (Flomax), 0.4 MG PO QAM





Scheduled PRN


Ondansetron Hcl (Zofran), 8 MG PO Q8H PRN for Nausea


Polyethylene Glycol 3350 (Miralax), 17 GM PO DAILY PRN for Constipation


Psyllium (Metamucil), 1 TBS PO UD PRN for Constipation


Senna (Senokot), 1 TAB PO UD PRN for Constipation


Sennosides-Docusate Sodium (Stool Softener), 1 TAB PO DAILY PRN for Constipation





Review of Systems


Gastrointestinal:  


   GI Comments:  occ diarrhea in the AM- dark in color


Oral:  


   Symptoms:  No Problems


Respiratory:  


   Symptoms:  WNL


Urinary:  


   Symptoms:  WNL


Skin:  


   Symptoms:  No Problems


Additional Notes:


He completed a distress management report and answer "no" to all questions.





Physical Exam





Vital Signs








  Date Time  Temp Pulse Resp B/P (MAP) Pulse Ox O2 Delivery O2 Flow Rate FiO2


 


2/23/18 09:45 36.3 67 16 150/80 96   








ECOG Performance Status:  0


General Appearance:  no apparent distress


Eyes:  normal inspection, PERRL, EOMI


ENT:  normal ENT inspection, hearing grossly normal


Respiratory/Chest:  lungs clear, no respiratory distress, no accessory muscle 

use


Cardiovascular:  regular rate, rhythm, no gallop, no murmur


Extremities:  no pedal edema


Neurologic/Psychiatric:  CNs II-XII nml as tested, no motor/sensory deficits, 

alert, normal mood/affect


Skin:  warm/dry





Pain Management


Patient Reports Pain:  Yes


Side:  Bilateral


Pain Location:  Head


Patient Preferred Pain Scale:  0 - 10


Initial Pain Intensity:  6.0


Pain Management Plan


Headaches occur 3 times per week.  They are relieved by taking hydrocodone.  He 

did not require any pain management through our office.





Laboratory


Laboratory Results:  not applicable





Pathology


Pathology Results:  not applicable





Imaging


Imaging Studies:  not applicable





Assessment & Plan


Plan: Continue regular follow-up with his primary care physician and neural 

oncologist.  He also follows with medical oncology.  He continues on 

chemotherapy.  He was seen today by Dr. Boykin.  He will see the neuro-

oncologist and have a recheck MRI in March.  We asked him to return to our 

office in 6 months.  We will obtain an MRI prior to that visit.  He may call 

our office if he has any questions or concerns in the interim.  Pain management 

through the neuro-oncologist or primary care physician.





Assessment & Plan (Attending)


I agree with note created by Kiya Carreno PA-C. I reviewed the patient's 

chart and information with her.  I have examined and evaluated the patient. I 

reviewed relevant clinical information and answered the patient's and/or family'

s questions. 





Total Time


In Follow-Up


I spent 20 minutes speaking to the patient and performing examination.  I spent 

20 minutes reviewing information, preparing the survivorship document, and 

completing this note.  AK





Total Time (Attending)


In Follow-Up


I spent 15 minutes examining and counseling the patient.

## 2018-04-17 NOTE — DIAGNOSTIC IMAGING REPORT
(CHEST) THORAX WITHOUT



CT DOSE: 657.77 mGycm



CLINICAL HISTORY: 78 years-old Male with GASTROESOPHAGEAL CA, IODINATED CONTRAST

ALLERGY**.  Follow-up study in a patient with history of metastatic esophageal

cancer.  



TECHNIQUE: Multiaxial CT images of the chest were performed without contrast.  A

dose lowering technique was utilized adhering to the principles of ALARA.



COMPARISON: Chest CT 1/12/2015, PET CT 11/1/2017.



FINDINGS: 

No dominant thyroid nodule identified. 6 mm calcification of the right thyroid

lobe. Evaluation for adenopathy is limited without the use of IV contrast. The

previously described FDG avid lymph nodes of the lower cervical and superior

mediastinal distribution are not well seen on today's study. 5 mm right

supraclavicular lymph node on image 18 series 4 previously measured 7 mm. No new

or progressively enlarged adenopathy about the chest identified. Heart is normal

in size without pericardial effusion. Mural fibrofatty changes involving the

left ventricular apex compatible with prior myocardial infarction. Coronary

arterial disease. Moderate atherosclerosis of the thoracic aorta without

aneurysm.



Pleural calcifications of the right hemithorax with unchanged pleural thickening

again noted. 2.2 x 1.3 cm area of pleural-based nodularity of the right lung

base is unchanged from image 219 series 4. This did not demonstrate

hypermetabolic activity on comparison PET CT. 8 mm solid nodule of the right

middle lobe seen on image 174 series 4 is unchanged. 3 mm solid nodule of the

right upper lobe, image 105 series 4 is also stable. Minimal linear nodularity

of the right upper lobe on image 147 series 4 suggests area of probable

scarring. 2 mm solid nodule of the right lower lobe is noted on image 194 series

4, not definitively seen on comparison. 2 mm nodule of the right upper lobe as

seen on image 114 series 4. Bibasilar groundglass opacities suggest atelectasis

with areas of scarring. Calcified granuloma of the apical posterior segment left

upper lobe. Pleural calcifications are seen adjacent to the lingula. Central

airways are patent.



Postoperative changes of the esophagus with gastric pull-through. No acute

process of the imaged upper abdomen. Pancreas is again seen herniating through

the diaphragmatic hiatus Low attenuating lesion of the posterior right hepatic

lobe is unchanged suggesting hepatic cyst. Soft tissues are unremarkable. There

are no suspicious lytic or blastic bony lesions identified.



IMPRESSION: 



1. No acute intrathoracic abnormality identified.

2. No new or progressive adenopathy. Slightly decreased size of a right

supraclavicular lymph node which demonstrated hypermetabolic activity on

comparison PET CT.

3. Unchanged 8 mm solid pulmonary nodule of the right middle lobe with scattered

additional 2 mm nodules seen bilaterally.

4. Esophageal resection with gastric pull-through. Portion of the pancreas is

again seen herniating through the diaphragmatic hiatus.







Electronically signed by:  Arnold David M.D.

4/17/2018 5:06 PM



Dictated Date/Time:  4/17/2018 4:51 PM

## 2018-04-17 NOTE — DIAGNOSTIC IMAGING REPORT
CT SCAN OF THE ABDOMEN AND PELVIS WITHOUT CONTRAST



CLINICAL HISTORY: GASTROESOPHAGEAL CA    



COMPARISON STUDY:  February 10, 2018 



TECHNIQUE: CT scan of the abdomen and pelvis was performed from the lung bases

to the proximal femurs. Images are reviewed in the axial, sagittal, and coronal

planes. IV contrast was not administered for this examination.  A dose lowering

technique was utilized adhering to the principles of ALARA.





CT DOSE:    

FINDINGS:



Lower chest: There are basilar atelectatic changes. There is interval decrease

in the size of an 8 mm right middle lobe pulmonary nodule. There are right lower

lobe consolidative changes within the area of presumed rounded atelectasis at

the right lung base. There are postsurgical changes of a gastric pull-through.



Liver: There is a 15 mm hypodensity within the right hepatic lobe, unchanged

from the prior study.



Gallbladder: Unremarkable.



Spleen: Normal in size and attenuation.



Pancreas: Unremarkable.



Adrenal glands: Unremarkable.



Kidneys: There is a 6.5 cm right renal cyst. No renal calculi are visualized.

There are a few small parapelvic cysts. No ureteral or bladder calculi are

visualized.



Bowel: There are no transition zones indicate bowel obstruction. There is

colonic diverticulosis. There is been near complete interval resolution of the

previously identified peridiverticular inflammatory change.. There is a left

upper quadrant jejunal intussusception. This is not resulting in obstructive

changes in the baby transient. A 3 cm polypoid lead point cannot be excluded.



Peritoneum: There is no intraperitoneal free air or abdominal ascites.



Vasculature: The abdominal aorta is normal in course and caliber.



Adenopathy: None.



Pelvic viscera: The prostate remains significantly enlarged measuring 54 mm



Skeletal structures: No destructive osseous lesions are seen.



IMPRESSION:  

1. Interval decrease in the size of the 8 mm right middle lobe pulmonary nodule

2. Colonic diverticulosis with near complete resolution of the previously

identified peridiverticular inflammatory change

3. Post surgical changes of a gastric pull-through

4. Stable hepatic hypodensity, likely representing a cyst.

5. Jejunal intussusception near the level of the ligament of Treitz. This is

likely transient. There is no current evidence of obstruction. A 3 cm polypoid

lesion point cannot be excluded with certainty







Electronically signed by:  David Billingsley M.D.

4/17/2018 4:27 PM



Dictated Date/Time:  4/17/2018 4:15 PM

## 2018-08-07 NOTE — RADIATION ONCOLOGY FOLLOW-UP
Radiation Oncology Follow-Up


Date of Visit


Aug 7, 2018.





Reason For Visit


Follow-up due to concerns for recent fall





Radiation Completion Date


to brain 1/23/18, to esophagus 3/5/08





Diagnosis





(1) METASTATIC ESOPHAGEAL CA, BRAIN METS


Status:  Chronic        Onset Date:  2008


Stage:  IV


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


Status post neoadjuvant radiation and chemotherapy.  Radiation completed 03/05/ 2008 received 4500 cGy


Status post resection April 2008 complete response


PET/CT 01/18/2012 mildly enlarged lymph nodes right chest and supraclavicular 

region


Status post needle aspiration biopsy showing metastatic adenocarcinoma 02/16/ 2012


Systemic chemotherapy


Onset of frequent headaches and finding of brain metastasis


Status post craniotomy with resection 12/12/2017, metastatic adenocarcinoma


Status post completion of radiation therapy 01/23/2018. He received 3000 cGy 

utilizing SBRT  Last Edited By: Kiya Carreno on Jan 29, 2018 16:00





History of Present Illness


Mr. Aranda presents with a history of recurrent metastatic esophageal cancer. 

As per


the wife, the patient was recently going to restart chemotherapy due to 

recurrent disease (medical records from [facility]


are unavailable at the time of consultation). The patient did have previous 

chemotherapy and radiation therapy in the past.


More recently, the patient was complaining of issues with memory. Dr. Anne Flores

, his primary care physician, ordered a


CT of the head without contrast on 12/01/2017 which revealed: "IMPRESSION: 1. 

Heterogeneous hyperattenuating intraaxial


mass containing central calcifications involves the left temporal lobe 

measuring up to 5.2 cm. Extensive associated


vasogenic edema is noted with sulcal effacement and mild rightward midline 

shift of 2 mm. Primary glial neoplasm or


metastatic disease are differential considerations. Neurosurgical consultation 

and follow-up MRI of the brain with and


without contrast recommended. 2. 11 x 4 mm focal area of low-attenuation of the 

left thalamus medial to the mass may


reflect reactive edema or small infarction. 3. Atrophy with chronic 

microvascular ischemic changes." Dr. Anne Flores has


sent the patient [facility] for direct admission. We are now 

being asked to evaluate the patient for


consideration of radiation therapy.


Currently, the patient is stable overall. The patient denies significant 

headaches however he does admit to issues with


short-term memory. He does have some fatigue.





He completed radiation therapy with SBRT 01/23/2018.  He received 3000 cGy in 5 

fractions.





Interim History


Patient has been seen June 28, 2018.  He had been hospitalized due to 

Clostridium difficile.  He was having problems with headaches and an MRI was 

obtained.  This did not show any intracranial lesions.  There was no evidence 

of recurrent disease.  He did have a finding of a 4 mm enhancing lesion 

favoring a schwannoma.  Patient has been having some feelings of being off 

balance.  He is walking unsteady.  He feels that he is veering to the left.  

His wife stated she feels that he is veering to the right.  Last week he leaned 

over to pick something up off of the floor and lost his balance.  He fell to 

the floor.  When trying to get up he fell again.  He did not sustain any 

injuries.  She was concerned that the small change noted on the MRI could be 

causing some of his issues.





Allergies


Coded Allergies:  


     Iodinated Diagnostic Agents (Verified  Adverse Reaction, Intermediate, 

HYPOTENSION, 2/19/18)





Home Medications


Scheduled


Cholecalciferol (Vitamin D3), 2,000 UNITS PO QAM


Dexamethasone (Decadron), 20 MG PO UD


Finasteride (Proscar), 5 MG PO QAM


Fluoxetine (Prozac), 40 MG PO QAM


Lorazepam (Ativan), 1 MG PO BID


Melatonin (Melatonin), 10-20 MG PO HS


Methylcellulose (Laxative) (Citrucel Fiber Laxative), 1 TBS PO DAILY


Omeprazole (Omeprazole), 20 MG PO BID


Probiotic Product (Probiotic), 1 CAP PO BID


Simvastatin (Zocor), 40 MG PO QAM


Tamsulosin Hcl (Flomax), 0.4 MG PO QAM


Vancomycin Hcl (Vancomycin), 250 MG PO QID





Scheduled PRN


Ondansetron Hcl (Zofran), 8 MG PO Q8H PRN for Nausea


Polyethylene Glycol 3350 (Miralax), 17 GM PO DAILY PRN for Constipation


Senna (Senokot), 1 TAB PO UD PRN for Constipation


Sennosides-Docusate Sodium (Stool Softener), 1 TAB PO DAILY PRN for Constipation





Miscellaneous Medications


Prenatal W/O Vit A W/ Fe Carbo (Citranatal Dha)





Review of Systems


Gastrointestinal:  


   Symptoms:  WNL


   GI Comments:  Did have c dif, now is ok


Oral:  


   Symptoms:  No Problems


Respiratory:  


   Symptoms:  SOB With Exertion


Urinary:  


   Symptoms:  WNL


Skin:  


   Symptoms:  No Problems





Physical Exam





Vital Signs








  Date Time  Temp Pulse Resp B/P (MAP) Pulse Ox O2 Delivery O2 Flow Rate FiO2


 


8/7/18 13:25 36.6 87 20 124/74 97   








Fatigue:  None


General Appearance:  no apparent distress


Eyes:  normal inspection, EOMI


ENT:  normal ENT inspection, hearing grossly normal


Respiratory/Chest:  lungs clear, no respiratory distress, no accessory muscle 

use


Cardiovascular:  regular rate, rhythm, no gallop, no murmur


Extremities:  no pedal edema


Neurologic/Psychiatric:  CNs II-XII nml as tested, no motor/sensory deficits, 

alert, normal mood/affect


Skin:  warm/dry





Pain Management


Patient Reports Pain:  No


Pain Location:  None


Patient Preferred Pain Scale:  0 - 10


Initial Pain Intensity:  0.0


Pain Management Plan


He denies pain therefore requires no pain management.





Laboratory


Laboratory Results:  were reviewed





Pathology


Pathology Results:  were reviewed, and pertinent findings noted in HPI





Imaging


Imaging Studies:  were reviewed


Imaging Comments


Reviewed in the interim history.





Assessment & Plan


Plan: The patient was seen and examined by Dr. Boykin.  His neurologic 

examination is not showing any issues with balance.  Continue regular follow-up 

with his primary care provider.  If he continues to have problems they are 

going to make an appointment with ENT.  We previously discussed and planned an 

MRI for September.  He will have a follow-up visit after completing the MRI.  

If he has any further issues or difficulty with scheduling an ENT appointment 

they are to call our office.





Assessment & Plan (Attending)


I agree with note created by Kiya Carreno PA-C. I reviewed the patient's 

chart and information with her.  I have examined and evaluated the patient. I 

reviewed relevant clinical information and answered the patient's and/or family'

s questions. 





Total Time


In Follow-Up


I spent 20 minutes speaking to the patient in performing examination.  I spent 

15 minutes reviewing information and completing this note.  AK





Total Time (Attending)


In Follow-Up


I spent 15 minutes examining and counseling the patient. 





Copy To


Alexander Joyner M.D.; Sulaiman Miller, D.O.

## 2018-08-21 NOTE — DIAGNOSTIC IMAGING REPORT
(CHEST) THORAX WITHOUT



CLINICAL HISTORY: 78 years-old Male with GASTROESOPHAGEAL CA.  Follow-up study

in a patient with history of gastroesophageal carcinoma  



TECHNIQUE: Multiaxial CT images of the chest were performed without contrast.  A

dose lowering technique was utilized adhering to the principles of ALARA.



COMPARISON: CT abdomen and pelvis of same day, CT chest 4/17/2018.



FINDINGS: 

Unchanged size and appearance of the 6 x 7 mm right supraclavicular lymph node

on image 31 series 4. No new or progressive adenopathy identified. No dominant

thyroid nodule. Heart is normal in size without pericardial effusion. Mural

fibrofatty changes involve the left ventricular apex compatible with prior

infarction. Coronary arterial calcifications are noted. Moderate calcification

of the thoracic aorta without aneurysm.



Pleural calcification of the right hemithorax with unchanged pleural thickening.

2.2 x 1.4 cm pleural-based nodularity of the right lung base appears unchanged,

image 228 series 4. Evaluation of the lung parenchyma is limited secondary to

respiratory motion. 8 mm solid nodule of the right middle lobe appears

unchanged, image 175 series 4. Stable 3 mm solid nodule of the right upper lobe,

image 111 series 4. There are a few new solid nodules of the intersegmental

right upper lobe measuring up to 3 mm, image 122 series 4 for example and also

image 146 series 4. Unchanged 8 mm groundglass opacity of the right upper lobe

on image 146 series 4. 4 mm solid nodule of the right lower lobe on image 193

series 4 is unchanged. Progressive solid nodules of the left upper lobe are seen

measuring up to 5 mm, image 85 series 4. Previously, nodules of the left upper

lobe are seen measuring up to 3 mm. Subsegmental groundglass opacities of the

lung bases suggest, patient atelectasis/scarring. Fissural nodule measuring 7 mm

of the right lower lobe appears unchanged. Central airways appear patent.



Prior subcutaneous resection with gastric pull-through. Unchanged hypodense

lesion about the posterior right hepatic lobe. Soft tissues are unremarkable.

The bones appear intact. No suspicious lytic or blastic bony lesions.



IMPRESSION: 



1. Progressive bilateral solid pulmonary nodules seen notably within the

bilateral upper lobes measuring up to 5 mm are suspicious for pulmonary

metastasis. 

2. No new or progressive adenopathy. Unchanged mildly prominent right

supraclavicular lymph node.

3. Chronic right-sided pleural thickening/nodularity with pleural

calcifications.

4. Prior esophageal resection with gastric pull-through.

5. Cardiomegaly.







Electronically signed by:  Arnold David M.D.

8/21/2018 3:14 PM



Dictated Date/Time:  8/21/2018 3:00 PM